# Patient Record
Sex: FEMALE | Race: WHITE | NOT HISPANIC OR LATINO | Employment: FULL TIME | URBAN - METROPOLITAN AREA
[De-identification: names, ages, dates, MRNs, and addresses within clinical notes are randomized per-mention and may not be internally consistent; named-entity substitution may affect disease eponyms.]

---

## 2022-05-12 ENCOUNTER — OFFICE VISIT (OUTPATIENT)
Dept: BARIATRICS | Facility: CLINIC | Age: 42
End: 2022-05-12
Payer: COMMERCIAL

## 2022-05-12 VITALS
HEIGHT: 69 IN | SYSTOLIC BLOOD PRESSURE: 122 MMHG | TEMPERATURE: 98.5 F | BODY MASS INDEX: 30.27 KG/M2 | HEART RATE: 78 BPM | WEIGHT: 204.4 LBS | DIASTOLIC BLOOD PRESSURE: 70 MMHG

## 2022-05-12 DIAGNOSIS — E66.9 OBESITY, CLASS I, BMI 30-34.9: Primary | ICD-10-CM

## 2022-05-12 PROBLEM — E66.811 OBESITY, CLASS I, BMI 30-34.9: Status: ACTIVE | Noted: 2022-05-12

## 2022-05-12 PROBLEM — E55.9 VITAMIN D DEFICIENCY: Status: ACTIVE | Noted: 2018-10-11

## 2022-05-12 PROCEDURE — 99203 OFFICE O/P NEW LOW 30 MIN: CPT | Performed by: PHYSICIAN ASSISTANT

## 2022-05-12 NOTE — PATIENT INSTRUCTIONS
Food log (ie ) www myfitnesspal com,sparkpeople  com,loseit com,[employer]  com,etc  baritastic-1100  No sugary beverages  At least 64oz of water daily  Recommend 3 times a week     Weight Management   WHAT YOU NEED TO KNOW:   Why is important to manage my weight? Being overweight increases your risk of health conditions such as heart disease, high blood pressure, type 2 diabetes, and certain types of cancer  It can also increase your risk for osteoarthritis, sleep apnea, and other respiratory problems  Aim for a slow, steady weight loss  Even a small amount of weight loss can lower your risk of health problems  What are the risks of being overweight? Extra weight can cause many health problems, including the following:  Diabetes (high blood sugar level)    High blood pressure or high cholesterol    Heart disease    Stroke    Gallbladder or liver disease    Cancer of the colon, breast, prostate, liver, or kidney    Sleep apnea    Arthritis or gout    What do I need to know about screening? Screening is done to check for health conditions before you have signs or symptoms  If you are 28to 79years old, your blood sugar level may be checked every 3 years for signs of prediabetes or diabetes  Your healthcare provider will check your blood pressure at each visit  High blood pressure can lead to a stroke or other problems  Your provider may check for signs of heart disease, cancer, or other health problems  How do I lose weight safely? A safe and healthy way to lose weight is to eat fewer calories and get regular exercise  You can lose up about 1 pound a week by decreasing the number of calories you eat by 500 calories each day  You can decrease calories by eating smaller portion sizes or by cutting out high-calorie foods  Read labels to find out how many calories are in the foods you eat  You can also burn calories with exercise such as walking, swimming, or biking   You will be more likely to keep weight off if you make these changes part of your lifestyle  Exercise at least 30 minutes per day on most days of the week  You can also fit in more physical activity by taking the stairs instead of the elevator or parking farther away from stores  Ask your healthcare provider about the best exercise plan for you  What is a healthy meal plan that can help me manage my weight? A healthy meal plan includes a variety of foods, contains fewer calories, and helps you stay healthy  A healthy meal plan includes the following:     Eat whole-grain foods more often  A healthy meal plan should contain fiber  Fiber is the part of grains, fruits, and vegetables that is not broken down by your body  Whole-grain foods are healthy and provide extra fiber in your diet  Some examples of whole-grain foods are whole-wheat breads and pastas, oatmeal, brown rice, and bulgur  Eat a variety of vegetables every day  Include dark, leafy greens such as spinach, kale, erik greens, and mustard greens  Eat yellow and orange vegetables such as carrots, sweet potatoes, and winter squash  Eat a variety of fruits every day  Choose fresh or canned fruit (canned in its own juice or light syrup) instead of juice  Fruit juice has very little or no fiber  Eat low-fat dairy foods  Drink fat-free (skim) milk or 1% milk  Eat fat-free yogurt and low-fat cottage cheese  Try low-fat cheeses such as mozzarella and other reduced-fat cheeses  Choose meat and other protein foods that are low in fat  Choose beans or other legumes such as split peas or lentils  Choose fish, skinless poultry (chicken or turkey), or lean cuts of red meat (beef or pork)  Before you cook meat or poultry, cut off any visible fat  Use less fat and oil  Try baking foods instead of frying them  Add less fat, such as margarine, sour cream, regular salad dressing and mayonnaise to foods  Eat fewer high-fat foods   Some examples of high-fat foods include french fries, doughnuts, ice cream, and cakes  Eat fewer sweets  Limit foods and drinks that are high in sugar  This includes candy, cookies, regular soda, and sweetened drinks  What are some ways I can decrease calories? Eat smaller portions  Use a small plate with smaller servings  Do not eat second helpings  When you eat at a restaurant, ask for a box and place half of your meal in the box before you eat  Share an entrée with someone else  Replace high-calorie snacks with healthy, low-calorie snacks  Choose fresh fruit, vegetables, fat-free rice cakes, or air-popped popcorn instead of potato chips, nuts, or chocolate  Choose water or calorie-free drinks instead of soda or sweetened drinks  Do not shop for groceries when you are hungry  You may be more likely to make unhealthy food choices  Take a grocery list of healthy foods and shop after you have eaten  Eat regular meals  Do not skip meals  Skipping meals can lead to overeating later in the day  This can make it harder for you to lose weight  Eat a healthy snack in place of a meal if you do not have time to eat a regular meal  Talk with a dietitian to help you create a meal plan and schedule that is right for you  What other things should I consider as I try to lose weight? Be aware of situations that may give you the urge to overeat, such as eating while watching television  Find ways to avoid these situations  For example, read a book, go for a walk, or do crafts  Meet with a weight loss support group or friends who are also trying to lose weight  This may help you stay motivated to continue working on your weight loss goals  CARE AGREEMENT:   You have the right to help plan your care  Learn about your health condition and how it may be treated  Discuss treatment options with your healthcare providers to decide what care you want to receive  You always have the right to refuse treatment   The above information is an  only  It is not intended as medical advice for individual conditions or treatments  Talk to your doctor, nurse or pharmacist before following any medical regimen to see if it is safe and effective for you  © Copyright [employer] 2022 Information is for End User's use only and may not be sold, redistributed or otherwise used for commercial purposes   All illustrations and images included in CareNotes® are the copyrighted property of [employer]  or [address] [place] [address]

## 2022-05-12 NOTE — ASSESSMENT & PLAN NOTE
-Discussed options of  and the role of weight loss medications   -Initial weight loss goal of 5-10% weight loss for improved health  - Labs reviewed from 11/2721 all within acceptable limits  - STOP BANG-0/8    -Patient is interested in pursuing conservative program but may do healthcore  Recommendations:  Food log (ie ) www myfitnesspal com,sparkpeople  com,loseit com,calorieking  com,etc  baritastic-1100  Recommend adding protein to breakfast or doing protein coffe  Plan for snack prior to coming home to avoid hunger and poor food choices  Try to decrease carb and increase veggies  No sugary beverages  At least 64oz of water daily  Recommend 3 times a week     Medications  -discussed possible options and side effects  Discussed that medications are an aide to help with weight loss but lifestyle medications need to be made in conjunction  Reluctant to do injectable    Not planning on another pregnancy

## 2022-05-12 NOTE — PROGRESS NOTES
Assessment/Plan:    Obesity, Class I, BMI 30-34 9  -Discussed options of  and the role of weight loss medications   -Initial weight loss goal of 5-10% weight loss for improved health  - Labs reviewed from 11/2721 all within acceptable limits  - STOP BANG-0/8    -Patient is interested in pursuing conservative program but may do healthcore  Recommendations:  Food log (ie ) www myfitnesspal com,sparkpeople  com,loseit com,calorieking  com,etc  baritastic-1100  Recommend adding protein to breakfast or doing protein coffe  Plan for snack prior to coming home to avoid hunger and poor food choices  Try to decrease carb and increase veggies  No sugary beverages  At least 64oz of water daily  Recommend 3 times a week     Medications  -discussed possible options and side effects  Discussed that medications are an aide to help with weight loss but lifestyle medications need to be made in conjunction  Reluctant to do injectable  Not planning on another pregnancy      Follow up in approximately 2 months with Non-Surgical Physician/Advanced Practitioner  Diagnoses and all orders for this visit:    Obesity, Class I, BMI 30-34 9    Other orders  -     levonorgestrel (MIRENA) 20 MCG/24HR IUD; 1 each by Intrauterine route          Subjective:   Chief Complaint   Patient presents with    Consult     MWM consult s/b 0-8 goal wt 155/160       Patient ID: Dawson Alvarez  is a 39 y o  female with excess weight/obesity here to pursue weight management  History reviewed  No pertinent past medical history  HPI:  Here for initial consult  She had been seen by [facility] dietician in the past  She also   had been doing [employer] for years  She feels she has a good knowledge of what to do but isnt doing it right now  Obesity/Excess Weight:  Severity: class I  Onset:  About 9 monhts ago   Daughter has visual impairment and was trying to adjust    Gaining more over the last few months  Modifiers: Diet and Exercise and Commercial Weight Loss Programs-ie  Weight Watchers, Una Anamaria, Nutrisystem, etc   Contributing factors: Stress/Emotional Eating  Associated symptoms: decreased exercise capacity    Goals:155-160  Hydration: water at least 60 oz, 1 diet coke, coffee creamer  Alcohol: 1-2 drinkes a week  Exercise:none  Occupation: works in [employer]    Diet Recall  B:coffee  S:fruit or crackers  L:food truck BBQ chicken (usually david jar salad or leftover)  S: cookie or candy if in office  S(really hungry ) can be whatever  D: burger and side salad yesterday  Venison sausage and fries today    Colonoscopy-Not applicable    The following portions of the patient's history were reviewed and updated as appropriate: She  has no past medical history on file  She   Patient Active Problem List    Diagnosis Date Noted    Obesity, Class I, BMI 30-34 9 2022    Vitamin D deficiency 10/11/2018    Allergic dermatitis 2016     She  has a past surgical history that includes Tonsillectomy and  section  Her family history includes Hypertension in her mother  She  reports current alcohol use  She reports that she does not use drugs  No history on file for tobacco use  Current Outpatient Medications   Medication Sig Dispense Refill    levonorgestrel (MIRENA) 20 MCG/24HR IUD 1 each by Intrauterine route       No current facility-administered medications for this visit  Current Outpatient Medications on File Prior to Visit   Medication Sig    levonorgestrel (MIRENA) 20 MCG/24HR IUD 1 each by Intrauterine route     No current facility-administered medications on file prior to visit  She has No Known Allergies       Review of Systems   Constitutional: Negative for chills and fever  Respiratory: Negative for shortness of breath  Cardiovascular: Negative for chest pain and palpitations  Gastrointestinal: Negative for abdominal pain, constipation, diarrhea and vomiting  Genitourinary: Negative for difficulty urinating  Musculoskeletal: Negative for arthralgias and back pain  Skin: Negative for rash  Neurological: Positive for light-headedness (in the past )  Negative for headaches  Psychiatric/Behavioral: Negative for dysphoric mood  The patient is not nervous/anxious  Objective:    /70 (BP Location: Left arm, Patient Position: Sitting, Cuff Size: Standard)   Pulse 78   Temp 98 5 °F (36 9 °C) (Tympanic)   Ht 5' 8 5" (1 74 m)   Wt 92 7 kg (204 lb 6 4 oz)   BMI 30 63 kg/m²     Physical Exam  Vitals and nursing note reviewed  Constitutional:       General: She is not in acute distress  Appearance: She is well-developed  She is obese  HENT:      Head: Normocephalic and atraumatic  Eyes:      Conjunctiva/sclera: Conjunctivae normal    Pulmonary:      Effort: Pulmonary effort is normal  No respiratory distress  Skin:     Findings: No rash (visible)  Neurological:      Mental Status: She is alert and oriented to person, place, and time     Psychiatric:         Mood and Affect: Mood normal          Behavior: Behavior normal

## 2022-10-20 ENCOUNTER — OFFICE VISIT (OUTPATIENT)
Dept: BARIATRICS | Facility: CLINIC | Age: 42
End: 2022-10-20
Payer: COMMERCIAL

## 2022-10-20 VITALS
DIASTOLIC BLOOD PRESSURE: 72 MMHG | HEIGHT: 69 IN | SYSTOLIC BLOOD PRESSURE: 118 MMHG | RESPIRATION RATE: 16 BRPM | WEIGHT: 204.2 LBS | BODY MASS INDEX: 30.24 KG/M2 | OXYGEN SATURATION: 99 % | HEART RATE: 76 BPM

## 2022-10-20 DIAGNOSIS — E66.9 OBESITY, CLASS I, BMI 30-34.9: Primary | ICD-10-CM

## 2022-10-20 PROCEDURE — 99214 OFFICE O/P EST MOD 30 MIN: CPT | Performed by: PHYSICIAN ASSISTANT

## 2022-10-20 NOTE — ASSESSMENT & PLAN NOTE
-Patient is pursuing Conservative Program  -Initial weight loss goal of 5-10% weight loss for improved health  -Screening labs 11/27/21    Initial:204 4  Current:204 3  Change:-0 1lb    Goals:  -recommend starting to Food log -1100 calorie goal  -discussed adding on  protein to breakfast or doing protein coffe  -Plan for snack prior to coming home to avoid hunger and poor food choices  -recommend increasing exercise-discussed walking on work breaks    Medication options were discussed today and she has been modifying her diet for last almost 6 months without success  To start saxenda  Patient denies personal and family history of MCT and MEN2 tumors  Patient denies personal history of pancreatitis  Side effects discussed but not limited to diarrhea, bloating, constipation, GI upset, heartburn, increased heart rate, headache, low blood sugar, fatigue and dizziness  Titration and medication administration discussed

## 2022-10-20 NOTE — PROGRESS NOTES
Assessment/Plan:    Obesity, Class I, BMI 30-34 9  -Patient is pursuing Conservative Program  -Initial weight loss goal of 5-10% weight loss for improved health  -Screening labs 11/27/21    Initial:204 4  Current:204 3  Change:-0 1lb    Goals:  -recommend starting to Food log -1100 calorie goal  -discussed adding on  protein to breakfast or doing protein coffe  -Plan for snack prior to coming home to avoid hunger and poor food choices  -recommend increasing exercise-discussed walking on work breaks    Medication options were discussed today and she has been modifying her diet for last almost 6 months without success  To start saxenda  Patient denies personal and family history of MCT and MEN2 tumors  Patient denies personal history of pancreatitis  Side effects discussed but not limited to diarrhea, bloating, constipation, GI upset, heartburn, increased heart rate, headache, low blood sugar, fatigue and dizziness  Titration and medication administration discussed  Follow up in approximately 2 months with Non-Surgical Physician/Advanced Practitioner  Diagnoses and all orders for this visit:    Obesity, Class I, BMI 30-34 9  -     liraglutide (SAXENDA) injection; Inject subcutaneously WEEK 1 use 0 6mg day,  WEEK 2 use 1 2mg day, WEEK 3 use 1 8mg day, WEEK 4 use 2 4mg day, WEEK 5 use 3mg day  -     Insulin Pen Needle 32G X 4 MM MISC; Use in the morning          Subjective:   Chief Complaint   Patient presents with   • Follow-up     MWM 2 mth fu         Patient ID: Elaine Dial  is a 39 y o  female with excess weight/obesity here to pursue weight managment  Patient is pursuing Conservative Program      HPI  Here for MWM follow up  Last seen in May and has been making dietary changes  She is adding more fruits/vegetables and planning more of lunch now  She is still not often eating breakfast  She has increased her water intake    Main issue is with increase hunger and cravings, mostly at night    She did do intermittent fasting for a while and then stopped due to hunger  She is interested in medication to help with this   Wt Readings from Last 10 Encounters:   10/20/22 92 6 kg (204 lb 3 2 oz)   22 92 7 kg (204 lb 6 4 oz)       Food logging:no, tried intermittently  Increased appetite/cravings:yes  Exercise:nothing specific, doing more walking at work  Hydration:80 oz water a day (has increasd), coffee with small amount of cream    Diet Recall  B:coffee  S (hungrier when getting to work):fruit or crackers  L:salad or leftover (veggie enchiladas today)  S: cookie or candy if in office  S(really hungry ) can be whatever  D: protein, carb, vegetable      Colonoscopy-Not applicable    The following portions of the patient's history were reviewed and updated as appropriate:   She  has no past medical history on file  She   Patient Active Problem List    Diagnosis Date Noted   • Obesity, Class I, BMI 30-34 9 2022   • Vitamin D deficiency 10/11/2018   • Allergic dermatitis 2016     She  has a past surgical history that includes Tonsillectomy and  section  Her family history includes Hypertension in her mother  She  reports that she has never smoked  She has never used smokeless tobacco  She reports that she does not drink alcohol and does not use drugs  Current Outpatient Medications   Medication Sig Dispense Refill   • Insulin Pen Needle 32G X 4 MM MISC Use in the morning 100 each 0   • liraglutide (SAXENDA) injection Inject subcutaneously WEEK 1 use 0 6mg day,  WEEK 2 use 1 2mg day, WEEK 3 use 1 8mg day, WEEK 4 use 2 4mg day, WEEK 5 use 3mg day 15 mL 1   • levonorgestrel (MIRENA) 20 MCG/24HR IUD 1 each by Intrauterine route       No current facility-administered medications for this visit       Current Outpatient Medications on File Prior to Visit   Medication Sig   • levonorgestrel (MIRENA) 20 MCG/24HR IUD 1 each by Intrauterine route     No current facility-administered medications on file prior to visit  She has No Known Allergies       Review of Systems   Constitutional: Negative for chills and fever  Respiratory: Negative for shortness of breath  Cardiovascular: Negative for chest pain and palpitations  Gastrointestinal: Negative for abdominal pain, constipation, diarrhea and vomiting  Genitourinary: Negative for difficulty urinating  Musculoskeletal: Negative for arthralgias and back pain  Skin: Negative for rash  Neurological: Negative for headaches  Psychiatric/Behavioral: Negative for dysphoric mood  The patient is not nervous/anxious  Objective:    /72   Pulse 76   Resp 16   Ht 5' 8 5" (1 74 m)   Wt 92 6 kg (204 lb 3 2 oz)   SpO2 99%   BMI 30 60 kg/m²      Physical Exam  Vitals and nursing note reviewed  Constitutional:       General: She is not in acute distress  Appearance: She is well-developed  She is obese  HENT:      Head: Normocephalic and atraumatic  Eyes:      Conjunctiva/sclera: Conjunctivae normal    Neck:      Thyroid: No thyromegaly  Pulmonary:      Effort: Pulmonary effort is normal  No respiratory distress  Skin:     Findings: No rash (visible)  Neurological:      Mental Status: She is alert and oriented to person, place, and time     Psychiatric:         Mood and Affect: Mood normal          Behavior: Behavior normal

## 2022-10-26 ENCOUNTER — TELEPHONE (OUTPATIENT)
Dept: BARIATRICS | Facility: CLINIC | Age: 42
End: 2022-10-26

## 2022-12-28 ENCOUNTER — OFFICE VISIT (OUTPATIENT)
Dept: BARIATRICS | Facility: CLINIC | Age: 42
End: 2022-12-28

## 2022-12-28 VITALS
BODY MASS INDEX: 27.46 KG/M2 | HEART RATE: 96 BPM | WEIGHT: 185.4 LBS | RESPIRATION RATE: 16 BRPM | HEIGHT: 69 IN | SYSTOLIC BLOOD PRESSURE: 108 MMHG | DIASTOLIC BLOOD PRESSURE: 60 MMHG

## 2022-12-28 DIAGNOSIS — Z86.39 HISTORY OF OBESITY: ICD-10-CM

## 2022-12-28 DIAGNOSIS — E66.3 OVERWEIGHT: Primary | ICD-10-CM

## 2022-12-28 RX ORDER — OFLOXACIN 3 MG/ML
SOLUTION/ DROPS OPHTHALMIC
COMMUNITY
Start: 2022-11-29

## 2022-12-28 RX ORDER — PREDNISOLONE ACETATE 10 MG/ML
SUSPENSION/ DROPS OPHTHALMIC
COMMUNITY
Start: 2022-11-29

## 2022-12-28 NOTE — PROGRESS NOTES
Assessment/Plan:    Overweight  -Patient is pursuing Conservative Program  -Initial weight loss goal of 5-10% weight loss for improved health  -initially class I obesity category but now in overweight BMI category  -Screening labs 11/27/21    Initial:204 4  Current:185 3  Change:-19 1lb    Goals:  -recommend starting to Food log -1100 calorie goal  -discussed adding on  protein to breakfast or doing protein coffe  -recommend returning back to exercise when cleared by ophthamology    To continue saxenda  (start weight 204 2 on 10/20/22)  9% weight loss  Denies any side effects currently        Follow up in approximately 2 months with Non-Surgical Physician/Advanced Practitioner  Diagnoses and all orders for this visit:    Overweight    History of obesity  -     liraglutide (SAXENDA) injection; Inject subcutaneously WEEK 1 use 0 6mg day,  WEEK 2 use 1 2mg day, WEEK 3 use 1 8mg day, WEEK 4 use 2 4mg day, WEEK 5 use 3mg day  -     Insulin Pen Needle 32G X 4 MM MISC; Use in the morning    Other orders  -     ofloxacin (OCUFLOX) 0 3 % ophthalmic solution; INSTILL 1 DROP INTO THE RIGHT EYE 4 TIMES A DAY  -     prednisoLONE acetate (PRED FORTE) 1 % ophthalmic suspension; INSTILL 1 DROP IN RIGHT EYE 4 TIMES A DAY          Subjective:   Chief Complaint   Patient presents with   • Follow-up     MWM 2mth f/u; waist 31in        Patient ID: Michelle Lemus  is a 43 y o  female with excess weight/obesity here to pursue weight managment  Patient is pursuing Conservative Program      HPI here for MWM followup  She is taking saxenda 2 4 mg  It is helping her appetite control her appetite and she has not felt she needed to go up to 3mg dose  She did have constipation initially (BM 2 times a week), but then started to have some loose stool with BM every otherday  Stool has normalized nw  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 since last seen here in November    She did have surgery and was on restrictions with movement  She has not returned back to work or exercise yet  Wt Readings from Last 10 Encounters:   22 84 1 kg (185 lb 6 4 oz)   10/20/22 92 6 kg (204 lb 3 2 oz)   22 92 7 kg (204 lb 6 4 oz)       Increased appetite/cravings:no controlled  Fruit/Vegetable servings:  Exercise:not currently  Hydration:water at least 80 oz     Diet Recall:  B: coffee  L: leftovers-soup or salad  S:sometimes fruit or yogurt  D:protein, carb sometimes and vegetable  S: not always  Colonoscopy-Not applicable    The following portions of the patient's history were reviewed and updated as appropriate: She  has no past medical history on file  She   Patient Active Problem List    Diagnosis Date Noted   • Overweight 2022   • Vitamin D deficiency 10/11/2018   • Allergic dermatitis 2016     She  has a past surgical history that includes Tonsillectomy and  section  Her family history includes Hypertension in her mother  She  reports that she has never smoked  She has never used smokeless tobacco  She reports that she does not drink alcohol and does not use drugs  Current Outpatient Medications   Medication Sig Dispense Refill   • Insulin Pen Needle 32G X 4 MM MISC Use in the morning 100 each 0   • levonorgestrel (MIRENA) 20 MCG/24HR IUD 1 each by Intrauterine route     • liraglutide (SAXENDA) injection Inject subcutaneously WEEK 1 use 0 6mg day,  WEEK 2 use 1 2mg day, WEEK 3 use 1 8mg day, WEEK 4 use 2 4mg day, WEEK 5 use 3mg day 15 mL 1   • ofloxacin (OCUFLOX) 0 3 % ophthalmic solution INSTILL 1 DROP INTO THE RIGHT EYE 4 TIMES A DAY  • prednisoLONE acetate (PRED FORTE) 1 % ophthalmic suspension INSTILL 1 DROP IN RIGHT EYE 4 TIMES A DAY       No current facility-administered medications for this visit       Current Outpatient Medications on File Prior to Visit   Medication Sig   • levonorgestrel (MIRENA) 20 MCG/24HR IUD 1 each by Intrauterine route   • ofloxacin (OCUFLOX) 0 3 % ophthalmic solution INSTILL 1 DROP INTO THE RIGHT EYE 4 TIMES A DAY  • prednisoLONE acetate (PRED FORTE) 1 % ophthalmic suspension INSTILL 1 DROP IN RIGHT EYE 4 TIMES A DAY   • [DISCONTINUED] Insulin Pen Needle 32G X 4 MM MISC Use in the morning   • [DISCONTINUED] liraglutide (SAXENDA) injection Inject subcutaneously WEEK 1 use 0 6mg day,  WEEK 2 use 1 2mg day, WEEK 3 use 1 8mg day, WEEK 4 use 2 4mg day, WEEK 5 use 3mg day     No current facility-administered medications on file prior to visit  She has No Known Allergies       Review of Systems   Constitutional: Negative for fatigue and fever  Eyes: Positive for redness  Respiratory: Negative for shortness of breath  Cardiovascular: Negative for chest pain and palpitations  Gastrointestinal: Negative for abdominal pain, constipation, diarrhea and vomiting  Denies GERD   Genitourinary: Negative for difficulty urinating  Musculoskeletal: Negative for arthralgias and back pain  Skin: Negative for rash  Neurological: Negative for headaches  Psychiatric/Behavioral: Negative for dysphoric mood  The patient is not nervous/anxious  Objective:    /60   Pulse 96   Resp 16   Ht 5' 8 5" (1 74 m)   Wt 84 1 kg (185 lb 6 4 oz)   BMI 27 78 kg/m²      Physical Exam  Vitals and nursing note reviewed  Constitutional:       General: She is not in acute distress  Appearance: She is well-developed  Comments: Overweight     HENT:      Head: Normocephalic and atraumatic  Eyes:      Comments: Right eye injected     Neck:      Thyroid: No thyromegaly  Pulmonary:      Effort: Pulmonary effort is normal  No respiratory distress  Skin:     Findings: No rash (visible)  Neurological:      Mental Status: She is alert and oriented to person, place, and time     Psychiatric:         Mood and Affect: Mood normal          Behavior: Behavior normal

## 2022-12-28 NOTE — ASSESSMENT & PLAN NOTE
-Patient is pursuing Conservative Program  -Initial weight loss goal of 5-10% weight loss for improved health  -initially class I obesity category but now in overweight BMI category  -Screening labs 11/27/21    Initial:204 4  Current:185 3  Change:-19 1lb    Goals:  -recommend starting to Food log -1100 calorie goal  -discussed adding on  protein to breakfast or doing protein coffe  -recommend returning back to exercise when cleared by ophthamology    To continue saxenda  (start weight 204 2 on 10/20/22)  9% weight loss   Denies any side effects currently

## 2023-02-28 ENCOUNTER — OFFICE VISIT (OUTPATIENT)
Dept: BARIATRICS | Facility: CLINIC | Age: 43
End: 2023-02-28

## 2023-02-28 VITALS
SYSTOLIC BLOOD PRESSURE: 118 MMHG | BODY MASS INDEX: 26.55 KG/M2 | DIASTOLIC BLOOD PRESSURE: 64 MMHG | HEART RATE: 71 BPM | WEIGHT: 177.2 LBS | OXYGEN SATURATION: 98 %

## 2023-02-28 DIAGNOSIS — E66.3 OVERWEIGHT: Primary | ICD-10-CM

## 2023-02-28 DIAGNOSIS — Z86.39 HISTORY OF OBESITY: ICD-10-CM

## 2023-02-28 DIAGNOSIS — E55.9 VITAMIN D DEFICIENCY: ICD-10-CM

## 2023-02-28 PROBLEM — H33.20 RETINAL DETACHMENT: Status: RESOLVED | Noted: 2023-02-28 | Resolved: 2023-02-28

## 2023-02-28 PROBLEM — H33.20 RETINAL DETACHMENT: Status: ACTIVE | Noted: 2023-02-28

## 2023-02-28 PROBLEM — Z86.69 HISTORY OF RETINAL DETACHMENT: Status: ACTIVE | Noted: 2023-02-28

## 2023-02-28 NOTE — ASSESSMENT & PLAN NOTE
Initial: 204  lbs  Current:177  lbs  Goal:  155 lbs    - Weight not at goal  - Patient is interested in Conservative Program  - Labs reviewed: As below   -No adverse reactions or side effects to Saxenda 3 mg daily  Medication refilled  General Recommendations:  Nutrition:  Eat breakfast daily  Do not skip meals  Food log (ie ) www myfitnesspal com, sparkpeople  com, loseit com, calorieking  com, etc     Practice mindful eating  Be sure to set aside time to eat, eat slowly, and savor your food  Hydration: At least 64oz of water daily  No sugar sweetened beverages  No juice (eat the fruit instead)  Exercise:  Studies have shown that the ideal exercise goal is somewhere between 150 to 300 minutes of moderate intensity exercise a week  Start with exercising 10 minutes every other day and gradually increase physical activity with a goal of at least 150 minutes of moderate intensity exercise a week, divided over at least 3 days a week  An example of this would be exercising 30 minutes a day, 5 days a week  Resistance training can increase muscle mass and increase our resting metabolic rate  FULL BODY resistance training is recommended 2-3 times a week  Do not do this on consecutive days to allow for muscle recovery  Aim for a bare minimum 5000 steps, even on days you do not exercise  Monitoring:   Weigh yourself daily  If this causes undue stress, then just weigh yourself once a week  Weigh yourself the same time of the day with the same amount of clothing on  Preferably this should be done after waking up, before you eat, and with no clothing or minimal clothing on  Specific Goals:  No sugary beverages  At least 64oz of water daily  Increase physical activity by 10 minutes daily  Calculated calorie goal for patient  Calorie goal: 1200-calorie goal recommended  Provided with meal plan  Reviewed meal plan  Return visit: 3 months

## 2023-02-28 NOTE — PROGRESS NOTES
Assessment/Plan:  Alejandra Echavarria was seen today for follow-up  Diagnoses and all orders for this visit:    Overweight    Vitamin D deficiency    History of obesity  -     liraglutide (SAXENDA) injection; Inject 0 5 mL (3 mg total) under the skin daily  -     Insulin Pen Needle 32G X 4 MM MISC; Use in the morning       Overweight  Initial: 204  lbs  Current:177  lbs  Goal:  155 lbs    - Weight not at goal  - Patient is interested in Conservative Program  - Labs reviewed: As below   -No adverse reactions or side effects to Saxenda 3 mg daily  Medication refilled  General Recommendations:  Nutrition:  Eat breakfast daily  Do not skip meals  Food log (ie ) www myfitnesspal com, sparkpeople  com, loseit com, calorieking  com, etc     Practice mindful eating  Be sure to set aside time to eat, eat slowly, and savor your food  Hydration: At least 64oz of water daily  No sugar sweetened beverages  No juice (eat the fruit instead)  Exercise:  Studies have shown that the ideal exercise goal is somewhere between 150 to 300 minutes of moderate intensity exercise a week  Start with exercising 10 minutes every other day and gradually increase physical activity with a goal of at least 150 minutes of moderate intensity exercise a week, divided over at least 3 days a week  An example of this would be exercising 30 minutes a day, 5 days a week  Resistance training can increase muscle mass and increase our resting metabolic rate  FULL BODY resistance training is recommended 2-3 times a week  Do not do this on consecutive days to allow for muscle recovery  Aim for a bare minimum 5000 steps, even on days you do not exercise  Monitoring:   Weigh yourself daily  If this causes undue stress, then just weigh yourself once a week  Weigh yourself the same time of the day with the same amount of clothing on    Preferably this should be done after waking up, before you eat, and with no clothing or minimal clothing on     Specific Goals:  No sugary beverages  At least 64oz of water daily  Increase physical activity by 10 minutes daily  Calculated calorie goal for patient  Calorie goal: 1200-calorie goal recommended  Provided with meal plan  Reviewed meal plan  Return visit: 3 months  Total time spent reviewing chart, interviewing patient, examining patient, discussing plan, answering all questions, and documentin min        ______________________________________________________________________        Subjective:   Chief Complaint   Patient presents with   • Follow-up     MWM 2 mth fu,     Patient here to discuss weight associated problems and nutrition goals  HPI: Janette Pressley  is a 43 y o  female with history of vitamin D deficiency and excess weight  Weight loss plan:  Conservative Program   This is my first time meeting with the patient for a follow-up appointment in the absence of her usual provider (Sim Boast)  Most recent notes and records were reviewed  Last appointment she reported taking Saxenda 2 4 mg daily and did not feel the need to increase to the 3 mg dose  She reported mild constipation initially which normalized  Wt Readings from Last 10 Encounters:   23 80 4 kg (177 lb 3 2 oz)   22 84 1 kg (185 lb 6 4 oz)   10/20/22 92 6 kg (204 lb 3 2 oz)   22 92 7 kg (204 lb 6 4 oz)     Initial weight: 204  Last OV weight: 185  Current weight: 177  Change in weight: -8lbs  -13% since starting liraglutide  Food logging:  Hunger/Cravings:  Sweets - eats fruit or oikos protein yogurt   Dining out:  Once every 2 weeks  Hydration: Water 64-80  Alcohol: No   Exercise: No  Sleep: 7-8  Weight Monitoring:  Daily  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 [employer]    Patient denies personal and family history of  pancreatitis, thyroid cancer, MEN-2 tumors  Denies any hx of glaucoma, seizures, kidney stones, gallstones  Denies Hx of CAD, PAD, palpitations, arrhythmia     Denies uncontrolled anxiety or depression, suicidal ideation or behavior, insomnia or sleep disturbance  The following portions of the patient's history were reviewed and updated as appropriate: allergies, current medications, past family history, past medical history, past social history, past surgical history, and problem list     Review Of Systems:  Review of Systems   Constitutional: Negative for activity change, appetite change, fatigue and fever  Respiratory: Negative for cough and shortness of breath  Cardiovascular: Negative for chest pain, palpitations and leg swelling  Gastrointestinal: Negative for abdominal pain, constipation, diarrhea, nausea and vomiting  Endocrine: Negative for cold intolerance and heat intolerance  Genitourinary: Negative for difficulty urinating and dysuria  Musculoskeletal: Negative for arthralgias, back pain and gait problem  Skin: Negative for pallor and rash  Neurological: Negative for headaches  Psychiatric/Behavioral: Negative for dysphoric mood, sleep disturbance and suicidal ideas (or HI)  The patient is not nervous/anxious  Objective:  /64   Pulse 71   Wt 80 4 kg (177 lb 3 2 oz)   SpO2 98%   BMI 26 55 kg/m²   Physical Exam  Vitals and nursing note reviewed  Constitutional:       General: She is not in acute distress  Appearance: Normal appearance  She is not ill-appearing or diaphoretic  Eyes:      General: No scleral icterus  Cardiovascular:      Rate and Rhythm: Normal rate and regular rhythm  Pulses: Normal pulses  Heart sounds: No murmur heard  Pulmonary:      Effort: Pulmonary effort is normal  No respiratory distress  Breath sounds: Normal breath sounds  No wheezing or rhonchi  Abdominal:      General: Bowel sounds are normal  There is no distension  Palpations: Abdomen is soft  There is no mass  Tenderness: There is no abdominal tenderness  Musculoskeletal:      Cervical back: Neck supple  Right lower leg: No edema  Left lower leg: No edema  Lymphadenopathy:      Cervical: No cervical adenopathy  Skin:     Capillary Refill: Capillary refill takes less than 2 seconds  Findings: No lesion or rash  Neurological:      Mental Status: She is alert and oriented to person, place, and time  Gait: Gait normal    Psychiatric:         Mood and Affect: Mood normal          Behavior: Behavior normal        Labs:  Labs from 11/27/2021 reviewed  Free T4 0 93  TSH 1 42  Insulin level 6 3  Lipid panel and CMP within normal limits  Lipid panel was quite favorable with an HDL of 70 and LDL of 65  Vitamin D level on 10 11/20/1836

## 2023-05-30 ENCOUNTER — OFFICE VISIT (OUTPATIENT)
Dept: BARIATRICS | Facility: CLINIC | Age: 43
End: 2023-05-30

## 2023-05-30 VITALS
HEART RATE: 82 BPM | SYSTOLIC BLOOD PRESSURE: 110 MMHG | DIASTOLIC BLOOD PRESSURE: 70 MMHG | BODY MASS INDEX: 25.83 KG/M2 | HEIGHT: 69 IN | RESPIRATION RATE: 17 BRPM | WEIGHT: 174.4 LBS

## 2023-05-30 DIAGNOSIS — E66.3 OVERWEIGHT: Primary | ICD-10-CM

## 2023-05-30 DIAGNOSIS — Z86.39 HISTORY OF OBESITY: ICD-10-CM

## 2023-05-30 NOTE — PROGRESS NOTES
Assessment/Plan:    Overweight  -Patient is pursuing Conservative Program  -Initial weight loss goal of 5-10% weight loss for improved health  -initially class I obesity category but now in overweight BMI category  -Screening labs 11/27/21    Initial:204 4  Current:174 4 (-2 8 lb from last OV)  Change:-30lb    Goals:  -continue current exercise 4 times a week  Recommend adding on resistance training  -continue with water intake  -discussed continuing to have well rounded diet    To continue saxenda  (start weight 204 2 on 10/20/22)  14 6% weight loss  Denies any side effects currently      Labs and records reviewed prior to OV today    Follow up in approximately 4 months with Non-Surgical Physician/Advanced Practitioner  Diagnoses and all orders for this visit:    Overweight    History of obesity  -     liraglutide (SAXENDA) injection; Inject 0 5 mL (3 mg total) under the skin daily  -     Insulin Pen Needle 32G X 4 MM MISC; Use in the morning          Subjective:   Chief Complaint   Patient presents with   • Follow-up     MWM 3mth f/u;Waist-30in        Patient ID: Yousif Cheema  is a 43 y o  female with excess weight/obesity here to pursue weight managment  Patient is pursuing Conservative Program      HPI Here for MWM followup  She is not having as much weight loss as prior but is noticing changes in her clothing size  She is currently doing run/walk program   She is taking the saxenda 3mg  She denies any side effects  Wt Readings from Last 10 Encounters:   05/30/23 79 1 kg (174 lb 6 4 oz)   02/28/23 80 4 kg (177 lb 3 2 oz)   12/28/22 84 1 kg (185 lb 6 4 oz)   10/20/22 92 6 kg (204 lb 3 2 oz)   05/12/22 92 7 kg (204 lb 6 4 oz)       Food logging:no  Increased appetite/cravings:  Fruit/Vegetable servings:  Exercise:first strides 4 times a week 45 minutes  Hydration:water 64 oz , coffee w/cream    Diet recal:  S:fruit or pretzel  L:sandwich or salad and fruit  D: salad 1-2 times a week   Protein (mostly chicken), veggie and starch      Colonoscopy-Not applicable    The following portions of the patient's history were reviewed and updated as appropriate:   She  has no past medical history on file  She   Patient Active Problem List    Diagnosis Date Noted   • History of retinal detachment 2023   • Overweight 2022   • Vitamin D deficiency 10/11/2018   • Allergic dermatitis 2016     She  has a past surgical history that includes Tonsillectomy and  section  Her family history includes Hypertension in her mother  She  reports that she has never smoked  She has never used smokeless tobacco  She reports that she does not drink alcohol and does not use drugs  Current Outpatient Medications   Medication Sig Dispense Refill   • Insulin Pen Needle 32G X 4 MM MISC Use in the morning 100 each 0   • levonorgestrel (MIRENA) 20 MCG/24HR IUD 1 each by Intrauterine route     • liraglutide (SAXENDA) injection Inject 0 5 mL (3 mg total) under the skin daily 15 mL 2   • ofloxacin (OCUFLOX) 0 3 % ophthalmic solution INSTILL 1 DROP INTO THE RIGHT EYE 4 TIMES A DAY  • prednisoLONE acetate (PRED FORTE) 1 % ophthalmic suspension INSTILL 1 DROP IN RIGHT EYE 4 TIMES A DAY       No current facility-administered medications for this visit  Current Outpatient Medications on File Prior to Visit   Medication Sig   • levonorgestrel (MIRENA) 20 MCG/24HR IUD 1 each by Intrauterine route   • [DISCONTINUED] Insulin Pen Needle 32G X 4 MM MISC Use in the morning   • [DISCONTINUED] liraglutide (SAXENDA) injection Inject 0 5 mL (3 mg total) under the skin daily   • ofloxacin (OCUFLOX) 0 3 % ophthalmic solution INSTILL 1 DROP INTO THE RIGHT EYE 4 TIMES A DAY  • prednisoLONE acetate (PRED FORTE) 1 % ophthalmic suspension INSTILL 1 DROP IN RIGHT EYE 4 TIMES A DAY     No current facility-administered medications on file prior to visit       Review of Systems   Constitutional: Negative for fever     Respiratory: Negative "for shortness of breath  Cardiovascular: Negative for chest pain and palpitations  Gastrointestinal: Negative for abdominal pain, constipation, diarrhea and vomiting  Genitourinary: Negative for difficulty urinating  Musculoskeletal: Negative for arthralgias and back pain  Skin: Negative for rash  Neurological: Negative for headaches  Psychiatric/Behavioral: Negative for dysphoric mood  The patient is not nervous/anxious  Objective:    /70   Pulse 82   Resp 17   Ht 5' 9\" (1 753 m)   Wt 79 1 kg (174 lb 6 4 oz)   BMI 25 75 kg/m²      Physical Exam  Vitals and nursing note reviewed  Constitutional:       General: She is not in acute distress  Appearance: She is well-developed  Comments: overnight   HENT:      Head: Normocephalic and atraumatic  Eyes:      Conjunctiva/sclera: Conjunctivae normal    Neck:      Thyroid: No thyromegaly  Pulmonary:      Effort: Pulmonary effort is normal  No respiratory distress  Skin:     Findings: No rash (visible)  Neurological:      Mental Status: She is alert and oriented to person, place, and time     Psychiatric:         Mood and Affect: Mood normal          Behavior: Behavior normal          "

## 2023-05-30 NOTE — ASSESSMENT & PLAN NOTE
-Patient is pursuing Conservative Program  -Initial weight loss goal of 5-10% weight loss for improved health  -initially class I obesity category but now in overweight BMI category  -Screening labs 11/27/21    Initial:204 4  Current:174 4 (-2 8 lb from last OV)  Change:-30lb    Goals:  -continue current exercise 4 times a week  Recommend adding on resistance training  -continue with water intake  -discussed continuing to have well rounded diet    To continue saxenda  (start weight 204 2 on 10/20/22)  14 6% weight loss  Denies any side effects currently

## 2023-06-07 DIAGNOSIS — Z86.39 HISTORY OF OBESITY: ICD-10-CM

## 2023-09-05 ENCOUNTER — TELEPHONE (OUTPATIENT)
Dept: BARIATRICS | Facility: CLINIC | Age: 43
End: 2023-09-05

## 2023-10-10 ENCOUNTER — TELEPHONE (OUTPATIENT)
Dept: BARIATRICS | Facility: CLINIC | Age: 43
End: 2023-10-10

## 2023-10-12 ENCOUNTER — OFFICE VISIT (OUTPATIENT)
Dept: BARIATRICS | Facility: CLINIC | Age: 43
End: 2023-10-12
Payer: COMMERCIAL

## 2023-10-12 VITALS
HEIGHT: 69 IN | WEIGHT: 184.8 LBS | HEART RATE: 70 BPM | SYSTOLIC BLOOD PRESSURE: 105 MMHG | BODY MASS INDEX: 27.37 KG/M2 | RESPIRATION RATE: 16 BRPM | DIASTOLIC BLOOD PRESSURE: 65 MMHG

## 2023-10-12 DIAGNOSIS — Z86.39 HISTORY OF OBESITY: ICD-10-CM

## 2023-10-12 DIAGNOSIS — E66.3 OVERWEIGHT: Primary | ICD-10-CM

## 2023-10-12 PROCEDURE — 99213 OFFICE O/P EST LOW 20 MIN: CPT | Performed by: INTERNAL MEDICINE

## 2023-10-12 NOTE — PROGRESS NOTES
Assessment/Plan:  Darshan Li was seen today for follow-up. Diagnoses and all orders for this visit:    Overweight    History of obesity  -     Insulin Pen Needle 32G X 4 MM MISC; Use in the morning  -     liraglutide (SAXENDA) injection; Inject 0.5 mL (3 mg total) under the skin daily    Patient is regaining weight now off of Saxenda. I provided her with a list of other pharmacies where [facility] can [facility]. Should she not be able to locate the medication and continue to regain weight we discussed other options including Contrave which may help her control her cravings. Discussed the common as well as rare but severe side effects of the medication. This is the preferred medication that she would be most interested in  should she not be able to remain on Saxenda. Patient demonstrate understanding and agreement with the plan. Total time spent reviewing chart, interviewing patient, examining patient, discussing plan, answering all questions, and documentin min.       ______________________________________________________________________        Subjective:   Chief Complaint   Patient presents with    Follow-up     MWM 4m f/u; waist-in     Patient here to discuss weight associated problems and nutrition goals  HPI: Orion Green  is a 43 y.o. female with history of MND deficiency and excess weight. Weight loss plan:  Conservative Program.   Most recent notes and records were reviewed. Patient typically follows with Artur Reyes. Had met with patient for the first time on 2023. At that time she was taking Saxenda 2.4 mg daily and did not feel the need to increase to 3 mg dose. Her weight was 177. He did increase Saxenda to 3 mg daily and followed up with Shira on 2023. Weight at that time was 174. Patient has regained 10 pounds since being off of the medication and fears that she is continuing to regain weight. Wishes to restart Saxenda.   Wt Readings from Last 10 Encounters:   10/12/23 83.8 kg (184 lb 12.8 oz)   05/30/23 79.1 kg (174 lb 6.4 oz)   02/28/23 80.4 kg (177 lb 3.2 oz)   12/28/22 84.1 kg (185 lb 6.4 oz)   10/20/22 92.6 kg (204 lb 3.2 oz)   05/12/22 92.7 kg (204 lb 6.4 oz)     Initial: 204  lbs  Prior office visit [address] logging:  Patient denies personal and family history of  pancreatitis, thyroid cancer, MEN-2 tumors. Denies any hx of glaucoma, seizures, kidney stones, gallstones. Denies Hx of CAD, PAD, palpitations, arrhythmia. Denies uncontrolled anxiety or depression, suicidal ideation or behavior, insomnia or sleep disturbance. The following portions of the patient's history were reviewed and updated as appropriate: allergies, current medications, past family history, past medical history, past social history, past surgical history, and problem list.    Review Of Systems:  Review of Systems   Constitutional:  Negative for activity change, appetite change, fatigue and fever. Respiratory:  Negative for cough and shortness of breath. Cardiovascular:  Negative for chest pain, palpitations and leg swelling. Gastrointestinal:  Negative for abdominal pain, constipation, diarrhea, nausea and vomiting. Endocrine: Negative for cold intolerance and heat intolerance. Genitourinary:  Negative for difficulty urinating and dysuria. Musculoskeletal:  Negative for arthralgias, back pain and gait problem. Skin:  Negative for pallor and rash. Neurological:  Negative for headaches. Psychiatric/Behavioral:  Negative for dysphoric mood, sleep disturbance and suicidal ideas (or HI). The patient is not nervous/anxious. Objective:  /65   Pulse 70   Resp 16   Ht 5' 9" (1.753 m)   Wt 83.8 kg (184 lb 12.8 oz)   BMI 27.29 kg/m²   Physical Exam  Constitutional:       Appearance: Normal appearance. Pulmonary:      Effort: Pulmonary effort is normal.   Neurological:      Mental Status: She is alert.    Psychiatric: Mood and Affect: Mood normal.         Behavior: Behavior normal.         Thought Content: Thought content normal.         Labs and Imaging  Recent labs and imaging have been personally reviewed.   No results found for: "WBC", "HGB", "HCT", "MCV", "PLT"  No results found for: "NA", "SODIUM", "K", "CL", "CO2", "ANIONGAP", "AGAP", "BUN", "CREATININE", "GLUC", "GLUF", "CALCIUM", "AST", "ALT", "ALKPHOS", "PROT", "TP", "BILITOT", "TBILI", "EGFR"  Lab Results   Component Value Date    HGBA1C 5.2 11/27/2021

## 2023-10-12 NOTE — PATIENT INSTRUCTIONS
VISIT SAXENDA. COM  INJECT SAXENDA DAILY SUBCUTANEOUSLY. -WEEK 1: 0.6mg daily  -if tolerated WEEK 2: 1.2mg daily  -if tolerated WEEK 3: 1.8mg daily  -if tolerated WEEK 4: 2.4mg daily  -if tolerated WEEK 5: 3mg daily    -IF NAUSEA/VOMITING DEVELOP STOP MEDICATION FOR A FEW DAYS AND DECREASE TO PREVIOUSLY TOLERATED DOSE. STAY HYDRATED. -IF YOU DEVELOP SEVERE ABDOMINAL PAIN WHICH MAY RADIATE TO THE BACK, SOMETIMES ASSOCIATED WITH FEVER, AND VOMITING, STOP MEDICATION AND SEEK URGENT CARE AS THIS MAY BE A SIGN OF PANCREATITIS. If you cannot get this medication, then we may consider starting you on contrave instead. Discharge instructions to read should you be started on this. You are being started on "contrave" or on the generic versions of its components at approximated dosages. If you develop abdominal upset, headache, dizziness, trouble sleeping, increased blood pressure, depression, anxiety, and fatigue, then you must contact the office right away. If you develop thoughts of harming yourself or others then stop the medication right away and go to the Emergency Room.

## 2023-10-20 DIAGNOSIS — E66.9 OBESITY, CLASS I, BMI 30-34.9: Primary | ICD-10-CM

## 2023-10-20 RX ORDER — NALTREXONE HYDROCHLORIDE 50 MG/1
25 TABLET, FILM COATED ORAL DAILY
Qty: 45 TABLET | Refills: 0 | Status: SHIPPED | OUTPATIENT
Start: 2023-10-20

## 2023-10-20 RX ORDER — BUPROPION HYDROCHLORIDE 150 MG/1
150 TABLET ORAL DAILY
Qty: 90 TABLET | Refills: 0 | Status: SHIPPED | OUTPATIENT
Start: 2023-10-20

## 2024-01-16 DIAGNOSIS — Z86.39 HISTORY OF OBESITY: ICD-10-CM

## 2024-01-16 RX ORDER — LIRAGLUTIDE 6 MG/ML
INJECTION, SOLUTION SUBCUTANEOUS
Qty: 15 ML | Refills: 2 | Status: SHIPPED | OUTPATIENT
Start: 2024-01-16

## 2024-02-19 ENCOUNTER — OFFICE VISIT (OUTPATIENT)
Dept: BARIATRICS | Facility: CLINIC | Age: 44
End: 2024-02-19
Payer: COMMERCIAL

## 2024-02-19 VITALS
HEIGHT: 69 IN | WEIGHT: 202.8 LBS | HEART RATE: 76 BPM | SYSTOLIC BLOOD PRESSURE: 110 MMHG | TEMPERATURE: 98.8 F | DIASTOLIC BLOOD PRESSURE: 72 MMHG | BODY MASS INDEX: 30.04 KG/M2

## 2024-02-19 DIAGNOSIS — E66.9 OBESITY, CLASS I, BMI 30-34.9: Primary | ICD-10-CM

## 2024-02-19 DIAGNOSIS — E55.9 VITAMIN D DEFICIENCY: ICD-10-CM

## 2024-02-19 PROBLEM — E66.811 OBESITY, CLASS I, BMI 30-34.9: Status: ACTIVE | Noted: 2024-02-19

## 2024-02-19 PROBLEM — E66.3 OVERWEIGHT: Status: RESOLVED | Noted: 2022-05-12 | Resolved: 2024-02-19

## 2024-02-19 PROCEDURE — 99214 OFFICE O/P EST MOD 30 MIN: CPT | Performed by: INTERNAL MEDICINE

## 2024-02-19 RX ORDER — TIRZEPATIDE 2.5 MG/.5ML
2.5 INJECTION, SOLUTION SUBCUTANEOUS WEEKLY
Qty: 2 ML | Refills: 0 | Status: SHIPPED | OUTPATIENT
Start: 2024-02-19 | End: 2024-03-18

## 2024-02-19 NOTE — PROGRESS NOTES
Assessment/Plan:  Kyra was seen today for follow-up.    Diagnoses and all orders for this visit:    Obesity, Class I, BMI 30-34.9  -     tirzepatide (Zepbound) 2.5 mg/0.5 mL auto-injector; Inject 0.5 mL (2.5 mg total) under the skin once a week for 28 days    Vitamin D deficiency       Initial: 204 lbs  Current: 202 lbs  Goal: 150 lbs    - Weight not at goal  - Patient is interested in Conservative Program  - Labs reviewed: As below.  -She would like to get back on a GLP-1 receptor agonist however Saxenda and Wegovy remain on shortage.  She is not sure if that pounds is covered by her insurance.  She demonstrated understanding that this is a GLP-1/GIP receptor agonist.  Potential side effects and risks are similar.  She accepts these risks and wishes to proceed with treatment.  She was shown how to inject using a demonstration pen.    General Recommendations:  Nutrition:  Eat breakfast daily.  Do not skip meals.     Food log (ie.) www.myfitnesspal.com, sparkpeople.com, loseit.com, calorieking.com, etc.    Practice mindful eating.  Be sure to set aside time to eat, eat slowly, and savor your food.    Hydration:    At least 64oz of water daily.  No sugar sweetened beverages.  No juice (eat the fruit instead).    Exercise:  Studies have shown that the ideal exercise goal is somewhere between 150 to 300 minutes of moderate intensity exercise a week.  Start with exercising 10 minutes every other day and gradually increase physical activity with a goal of at least 150 minutes of moderate intensity exercise a week, divided over at least 3 days a week.  An example of this would be exercising 30 minutes a day, 5 days a week.  Resistance training can increase muscle mass and increase our resting metabolic rate.   FULL BODY resistance training is recommended 2-3 times a week.  Do not do this on consecutive days to allow for muscle recovery.    Aim for a bare minimum 5000 steps, even on days you do not  exercise.    Monitoring:   Weigh yourself daily.  If this causes undue stress, then just weigh yourself once a week.  Weigh yourself the same time of the day with the same amount of clothing on.  Preferably this should be done after waking up, before you eat, and with no clothing or minimal clothing on.    Specific Goals:  Food log (ie.) www.Firm58.com,sparkpeople.com,XMPieit.com,Nvest.com,etc.     Continue tracking steps.      START ZEPBOUND  I have sent Zebound to your pharmacy. The prior authorization process will been done through our prior authorization team and can take up to 3 weeks to process through the insurance.      - Start Zepbound 2.5 mg subcutaneously injection weekly.  You will need a nurse visit in 4 weeks.  If you are doing well at that time the dose will be increased to Zepbound 5mg weekly.     - Side effects of Zepbound include nausea, vomiting, diarrhea, or constipation. Keep an eye on your heart rate while on Zepbound. If you resting heart rate is greater than 100 beats per minutes, please notify me. If you develop severe abdominal pain, stop Zepbound and go to the emergency room, as that could be a sign of pancreatitis.      - Please notify me if you have surgery, upper endoscopy, or colonoscopy scheduled, as we typically hold Zepbound for one week prior to the procedure.   - Zepbound can reduce the effectiveness of oral hormonal birth control (birth control pills). Recommend a barrier backup method such as condoms to prevent pregnancy.       Return visit:  3 months    Total time spent reviewing chart, interviewing patient, examining patient, discussing plan, answering all questions, and documentin min.       ______________________________________________________________________        Subjective:   Chief Complaint   Patient presents with    Follow-up      MWM  4 month F/U  waist 36 inch .     Patient here to discuss weight associated problems and nutrition goals  HPI: Kyra  "Kathleen  is a 43 y.o. female with history of Vitamin D deficiency and excess weight.  Weight loss plan:  Conservative Program.   Most recent notes and records were reviewed.    Wt Readings from Last 10 Encounters:   02/19/24 92 kg (202 lb 12.8 oz)   10/12/23 83.8 kg (184 lb 12.8 oz)   05/30/23 79.1 kg (174 lb 6.4 oz)   02/28/23 80.4 kg (177 lb 3.2 oz)   12/28/22 84.1 kg (185 lb 6.4 oz)   10/20/22 92.6 kg (204 lb 3.2 oz)   05/12/22 92.7 kg (204 lb 6.4 oz)     Initial: 204  lbs  Prior office visit: 184 lbs  Current: 202 lbs      Patient was on Saxenda and had gotten her weight down to 174 prior to being affected by the shortage.  Coming off of Saxenda she has gradually regained weight.  When she was last seen on 10/12/2023 her weight was 184.  Today it is 202.  We discussed starting Contrave to help mitigate weight regain.  She did not have coverage for Contrave so the generic ingredients were prescribed off label.  She reached out to me on 12/20/2023 after being on Wellbutrin 150 mg daily and naltrexone 25 mg daily for 2 months.  She had gained another 4 pounds.  Reports having stopped them before that due to giving her \"weird, crazy dreams.\"    She has stopped calorie tracking but working on reducing portion sizes and snacking.  He continues to step count.  She has a goal of and achieves approximately 5000 steps a day on weekdays and 10,000 steps on weekends.      Patient denies personal and family history of  pancreatitis, thyroid cancer, MEN-2 tumors.    The following portions of the patient's history were reviewed and updated as appropriate: allergies, current medications, past family history, past medical history, past social history, past surgical history, and problem list.    Review Of Systems:  Review of Systems   Constitutional:  Negative for activity change, appetite change, fatigue and fever.   Respiratory:  Negative for cough and shortness of breath.    Cardiovascular:  Negative for chest pain, palpitations " "and leg swelling.   Gastrointestinal:  Negative for abdominal pain, constipation, diarrhea, nausea and vomiting.   Endocrine: Negative for cold intolerance and heat intolerance.   Genitourinary:  Negative for difficulty urinating and dysuria.   Musculoskeletal:  Negative for arthralgias, back pain and gait problem.   Skin:  Negative for pallor and rash.   Neurological:  Negative for headaches.   Psychiatric/Behavioral:  Negative for dysphoric mood, sleep disturbance and suicidal ideas (or HI). The patient is not nervous/anxious.        Objective:  /72   Pulse 76   Temp 98.8 °F (37.1 °C) (Tympanic)   Ht 5' 8.5\" (1.74 m)   Wt 92 kg (202 lb 12.8 oz)   BMI 30.39 kg/m²   Physical Exam  Vitals and nursing note reviewed.   Constitutional:       General: She is not in acute distress.     Appearance: Normal appearance. She is not ill-appearing or diaphoretic.   Eyes:      General: No scleral icterus.  Cardiovascular:      Rate and Rhythm: Normal rate and regular rhythm.      Pulses: Normal pulses.      Heart sounds: No murmur heard.  Pulmonary:      Effort: Pulmonary effort is normal. No respiratory distress.      Breath sounds: Normal breath sounds. No wheezing or rhonchi.   Abdominal:      General: Bowel sounds are normal. There is no distension.      Palpations: Abdomen is soft. There is no mass.      Tenderness: There is no abdominal tenderness.   Musculoskeletal:      Cervical back: Neck supple.      Right lower leg: No edema.      Left lower leg: No edema.   Lymphadenopathy:      Cervical: No cervical adenopathy.   Skin:     Capillary Refill: Capillary refill takes less than 2 seconds.      Findings: No lesion or rash.   Neurological:      Mental Status: She is alert and oriented to person, place, and time.      Gait: Gait normal.   Psychiatric:         Mood and Affect: Mood normal.         Behavior: Behavior normal.         Labs and Imaging  Recent labs and imaging have been personally reviewed.  No results " "found for: \"WBC\", \"HGB\", \"HCT\", \"MCV\", \"PLT\"  Lab Results   Component Value Date    SODIUM 140 11/27/2021    K 4.2 11/27/2021     (H) 11/27/2021    CO2 26 11/27/2021    AGAP 4 11/27/2021    BUN 8 11/27/2021    CREATININE 0.76 11/27/2021    GLUC 82 11/27/2021    CALCIUM 8.9 11/27/2021    AST 23 11/27/2021    ALT 29 11/27/2021    ALKPHOS 76 11/27/2021    TP 7.0 11/27/2021    TBILI 0.5 11/27/2021     Lab Results   Component Value Date    HGBA1C 5.2 11/27/2021     Lab Results   Component Value Date    TSH 1.42 11/27/2021       "

## 2024-02-19 NOTE — PATIENT INSTRUCTIONS
Initial: 204 lbs  Current: 202 lbs  Goal: 150 lbs    General Recommendations:  Nutrition:  Eat breakfast daily.  Do not skip meals.     Food log (ie.) www.myfitnesspal.com, sparkpeople.com, loseit.com, calorieking.com, etc.    Practice mindful eating.  Be sure to set aside time to eat, eat slowly, and savor your food.    Hydration:    At least 64oz of water daily.  No sugar sweetened beverages.  No juice (eat the fruit instead).    Exercise:  Studies have shown that the ideal exercise goal is somewhere between 150 to 300 minutes of moderate intensity exercise a week.  Start with exercising 10 minutes every other day and gradually increase physical activity with a goal of at least 150 minutes of moderate intensity exercise a week, divided over at least 3 days a week.  An example of this would be exercising 30 minutes a day, 5 days a week.  Resistance training can increase muscle mass and increase our resting metabolic rate.   FULL BODY resistance training is recommended 2-3 times a week.  Do not do this on consecutive days to allow for muscle recovery.    Aim for a bare minimum 5000 steps, even on days you do not exercise.    Monitoring:   Weigh yourself daily.  If this causes undue stress, then just weigh yourself once a week.  Weigh yourself the same time of the day with the same amount of clothing on.  Preferably this should be done after waking up, before you eat, and with no clothing or minimal clothing on.    Specific Goals:  Food log (ie.) www.myfitnesspal.com,sparkpeople.com,loseit.com,calorieking.com,etc.     Continue tracking steps.      START ZEPBOUND  I have sent Zebound to your pharmacy. The prior authorization process will been done through our prior authorization team and can take up to 3 weeks to process through the insurance.      - Start Zepbound 2.5 mg subcutaneously injection weekly.  You will need a nurse visit in 4 weeks.  If you are doing well at that time the dose will be increased to Zepbound  5mg weekly.     - Side effects of Zepbound include nausea, vomiting, diarrhea, or constipation. Keep an eye on your heart rate while on Zepbound. If you resting heart rate is greater than 100 beats per minutes, please notify me. If you develop severe abdominal pain, stop Zepbound and go to the emergency room, as that could be a sign of pancreatitis.      - Please notify me if you have surgery, upper endoscopy, or colonoscopy scheduled, as we typically hold Zepbound for one week prior to the procedure.   - Zepbound can reduce the effectiveness of oral hormonal birth control (birth control pills). Recommend a barrier backup method such as condoms to prevent pregnancy.       Return visit:  3 months

## 2024-03-04 ENCOUNTER — TELEPHONE (OUTPATIENT)
Age: 44
End: 2024-03-04

## 2024-03-04 ENCOUNTER — TELEPHONE (OUTPATIENT)
Dept: BARIATRICS | Facility: CLINIC | Age: 44
End: 2024-03-04

## 2024-03-04 DIAGNOSIS — E66.9 OBESITY, CLASS I, BMI 30-34.9: Primary | ICD-10-CM

## 2024-03-04 NOTE — TELEPHONE ENCOUNTER
Pt asked about switching  from zepound to wegovy since she cannot receive zepound. I stated I can message the physician to see if he will approve the medication change. In the meantime we reschedule her nurse visit out another month

## 2024-03-04 NOTE — TELEPHONE ENCOUNTER
Problem: Adult Inpatient Plan of Care  Goal: Plan of Care Review  8/6/2021 0732 by Christina Bangura RNA  Outcome: Ongoing, Progressing  Flowsheets  Taken 8/6/2021 0732  Progress: no change  Outcome Summary: recieved patient from ED. patient on 2L nasal cannula sats above 90%. No shortness of air reported at this time.  Taken 8/6/2021 0717  Plan of Care Reviewed With: patient  8/6/2021 0731 by Christina Bangura RNA  Outcome: Ongoing, Progressing  Flowsheets (Taken 8/6/2021 0717)  Progress: no change  Plan of Care Reviewed With: patient  Outcome Summary: Recieved patient from ED. Patient on 2L nasal cannula  Goal: Patient-Specific Goal (Individualized)  8/6/2021 0732 by Christina Bangura RNA  Outcome: Ongoing, Progressing  8/6/2021 0731 by Christina Bangura RNA  Outcome: Ongoing, Progressing  Goal: Absence of Hospital-Acquired Illness or Injury  8/6/2021 0732 by Christina Bangura RNA  Outcome: Ongoing, Progressing  8/6/2021 0731 by Christina Bangura RNA  Outcome: Ongoing, Progressing  Intervention: Identify and Manage Fall Risk  Recent Flowsheet Documentation  Taken 8/6/2021 0700 by Christina Bangura RNA  Safety Promotion/Fall Prevention: safety round/check completed  Taken 8/6/2021 0519 by Christina Bangura RNA  Safety Promotion/Fall Prevention: safety round/check completed  Taken 8/6/2021 0515 by Christina Bangura RNA  Safety Promotion/Fall Prevention: safety round/check completed  Goal: Optimal Comfort and Wellbeing  8/6/2021 0732 by Christina Bangura RNA  Outcome: Ongoing, Progressing  8/6/2021 0731 by Christina Bangura RNA  Outcome: Ongoing, Progressing  Intervention: Provide Person-Centered Care  Recent Flowsheet Documentation  Taken 8/6/2021 0519 by Christina Bangura RNA  Trust Relationship/Rapport:   care explained   emotional support provided   choices provided  Goal: Readiness for Transition of Care  8/6/2021 0732 by Christina Bangura RNA  Outcome: Ongoing, Progressing  8/6/2021 0731 by Christina Bangura, RNA  Outcome: Ongoing,  Patient called to reschedule nurse visit and to ask questions regarding new prescription since the original prescriptioin is taking too long to be approved through insurance. Warm transferred patient to [facility] at the office.    Progressing  Intervention: Mutually Develop Transition Plan  Recent Flowsheet Documentation  Taken 8/6/2021 0520 by Christina Bangura RNA  Transportation Anticipated: family or friend will provide  Patient/Family Anticipated Services at Transition: none  Patient/Family Anticipates Transition to: home with family  Taken 8/6/2021 0510 by Christina Bangura RNA  Equipment Currently Used at Home:   cpap   oxygen   Goal Outcome Evaluation:  Plan of Care Reviewed With: patient        Progress: no change  Outcome Summary: recieved patient from ED. patient on 2L nasal cannula sats above 90%. No shortness of air reported at this time.

## 2024-03-05 ENCOUNTER — TELEPHONE (OUTPATIENT)
Age: 44
End: 2024-03-05

## 2024-03-05 NOTE — TELEPHONE ENCOUNTER
"----- Message from Wayne Flores DO sent at 2/29/2024 12:50 PM EST -----  Regarding: FW: Zepbound prior authorization  Contact: [phone]  Do we have a denial letter on record?  I didn't see one in the \"media\" section.    Wayne Flores    ----- Message -----  From: Mackenzie Noel  Sent: 2/29/2024   8:45 AM EST  To: Wayne Flores DO  Subject: FW: Zepbound prior authorization                   ----- Message -----  From: Kyra Wang  Sent: 2/28/2024   7:27 PM EST  To: [facility]  Subject: Zepbound prior authorization                     Hi there again-- I was at [facility] picking up an Rx for my  and asked about the status of the Zepbound. They said that the insurance rejected it because it wasn't on the formulary but that the prescribing doctor could fill out the form and that it could go through. The pharmacy technician sent that over to you electronically around 6:45 PM tonight. Thank you so much for all of your assistance. I appreciate it.      "

## 2024-03-11 ENCOUNTER — TELEPHONE (OUTPATIENT)
Age: 44
End: 2024-03-11

## 2024-03-11 NOTE — TELEPHONE ENCOUNTER
Pt called in regards to Wegovy med. Pt stated can't find med. Pt is wondering if can go back to [facility]. Pt requested a call back from practice to explore options.

## 2024-03-15 NOTE — TELEPHONE ENCOUNTER
Kyra Flores,     Patient called today, she is a little frustrated, she wants to go back on ZEPBOUND because she can't find the Wegovy., she did call on Monday 03/11    Pharmacy:  [facility] - [place], [place] - [address], [facility] [address]  Phone: [phone]  Fax: [phone]     CB: [phone]

## 2024-03-20 DIAGNOSIS — E66.9 OBESITY, CLASS I, BMI 30-34.9: Primary | ICD-10-CM

## 2024-03-20 RX ORDER — TIRZEPATIDE 2.5 MG/.5ML
2.5 INJECTION, SOLUTION SUBCUTANEOUS WEEKLY
Qty: 2 ML | Refills: 0 | Status: SHIPPED | OUTPATIENT
Start: 2024-03-20 | End: 2024-03-27

## 2024-03-22 ENCOUNTER — TELEPHONE (OUTPATIENT)
Dept: BARIATRICS | Facility: CLINIC | Age: 44
End: 2024-03-22

## 2024-03-22 ENCOUNTER — TELEPHONE (OUTPATIENT)
Age: 44
End: 2024-03-22

## 2024-03-22 NOTE — TELEPHONE ENCOUNTER
----- Message from Sonya Howard sent at 3/21/2024  9:17 AM EDT -----  Regarding: FW: New medicine  Contact: [phone]    ----- Message -----  From: Kyra Wang  Sent: 3/21/2024   9:14 AM EDT  To: [facility]  Subject: New medicine                                     Hi there--the Rx went through to [facility] but they need a prior authorization. Could it be faxed to them at [phone]? Their phone number is [phone]. Thank you!

## 2024-03-23 NOTE — TELEPHONE ENCOUNTER
PA for zepbound    Submitted via    [x]CMM-KEY [id number]  [][email or web address]-Case ID #    []Faxed to plan   []Other website    []Phone call Case ID #      Office notes sent, clinical questions answered. Awaiting determination    Turnaround time for your insurance to make a decision on your Prior Authorization can take 7-21 business days.

## 2024-03-25 NOTE — TELEPHONE ENCOUNTER
PA for Zepbound excluded    Reason:        Message sent to office clinical pool Yes    Denial letter scanned into Media Yes    Appeal started No

## 2024-03-27 DIAGNOSIS — E66.9 OBESITY, CLASS I, BMI 30-34.9: Primary | ICD-10-CM

## 2024-03-27 NOTE — TELEPHONE ENCOUNTER
Patient was informed of the providers message. Celina is stating that provider can sent a script for Wegovy To [facility] on file already. Patient stating that she had been prescribed Wegovy and Saxenda but due to the supply chain she has not be able to get it. Patient is okay with doing wegovy and will start calling around for it.

## 2024-04-10 ENCOUNTER — TELEPHONE (OUTPATIENT)
Dept: BARIATRICS | Facility: CLINIC | Age: 44
End: 2024-04-10

## 2024-04-11 ENCOUNTER — TELEPHONE (OUTPATIENT)
Dept: BARIATRICS | Facility: CLINIC | Age: 44
End: 2024-04-11

## 2024-04-11 ENCOUNTER — CLINICAL SUPPORT (OUTPATIENT)
Dept: BARIATRICS | Facility: CLINIC | Age: 44
End: 2024-04-11

## 2024-04-11 VITALS
HEART RATE: 83 BPM | RESPIRATION RATE: 17 BRPM | SYSTOLIC BLOOD PRESSURE: 100 MMHG | BODY MASS INDEX: 30.07 KG/M2 | TEMPERATURE: 98.1 F | DIASTOLIC BLOOD PRESSURE: 60 MMHG | HEIGHT: 69 IN | WEIGHT: 203 LBS

## 2024-04-11 DIAGNOSIS — R63.5 ABNORMAL WEIGHT GAIN: Primary | ICD-10-CM

## 2024-04-11 DIAGNOSIS — E66.9 OBESITY, CLASS I, BMI 30-34.9: Primary | ICD-10-CM

## 2024-04-11 PROCEDURE — RECHECK

## 2024-04-11 NOTE — TELEPHONE ENCOUNTER
Next dose of Wegovy 0.5 mg sent to [facility]. She should give us another update after taking the second pen of 0.5 mg. If she is tolerating it well, we can plan on submitting for the next dose.

## 2024-04-11 NOTE — PROGRESS NOTES
Patient last visit weight:202lb  Patient current visit weight:203lb    If you are taking phentermine or other oral weight loss medications, are you experiencing any of the following symptoms:  Headache:   Blurred Vision:   Chest Pain:   Palpitations:  Insomnia:   SPECIFY ORAL MEDICATION AND DOSAGE:     If you are taking an injectable medication,  are you experiencing any of the following symptoms:  Bloating:  NO  Nausea: NO  Vomiting: NO  Constipation: NO  Diarrhea:NO  SPECIFY INJECTABLE MEDICATION AND CURRENT DOSAGE: Wegovy0.25mg    PT ASKING FOR DOSE INCREASE, NO ISSUES WITH PREVIOUS DOSE.        Vitals:    Is BP less than 100/60? NO  Is BP greater than 140/90? NO  Is HR greater than 100? NO  **If yes to any of the above, have patient relax and repeat in 5-10 minutes**    Repeat values:    Is BP less than 100/60?  Is BP greater than 140/90?  Is HR greater than 100?  **If values remain outside of ranges above, please consult provider for next steps**

## 2024-05-20 DIAGNOSIS — E66.9 OBESITY, CLASS I, BMI 30-34.9: Primary | ICD-10-CM

## 2024-06-11 ENCOUNTER — OFFICE VISIT (OUTPATIENT)
Dept: BARIATRICS | Facility: CLINIC | Age: 44
End: 2024-06-11
Payer: COMMERCIAL

## 2024-06-11 VITALS
HEART RATE: 56 BPM | TEMPERATURE: 98.6 F | HEIGHT: 69 IN | WEIGHT: 194.4 LBS | DIASTOLIC BLOOD PRESSURE: 70 MMHG | RESPIRATION RATE: 17 BRPM | SYSTOLIC BLOOD PRESSURE: 110 MMHG | BODY MASS INDEX: 28.79 KG/M2

## 2024-06-11 DIAGNOSIS — E66.9 OBESITY, CLASS I, BMI 30-34.9: Primary | ICD-10-CM

## 2024-06-11 DIAGNOSIS — Z51.81 THERAPEUTIC DRUG MONITORING: ICD-10-CM

## 2024-06-11 DIAGNOSIS — E55.9 VITAMIN D DEFICIENCY: ICD-10-CM

## 2024-06-11 PROBLEM — E66.3 OVERWEIGHT: Status: ACTIVE | Noted: 2024-06-11

## 2024-06-11 PROCEDURE — 99214 OFFICE O/P EST MOD 30 MIN: CPT | Performed by: INTERNAL MEDICINE

## 2024-06-11 NOTE — PATIENT INSTRUCTIONS
Calorie track at least 1 day a week.    Aim for at least 150 minutes of increased activity each week divided over at least 3 days a week.    Drink at least 64 ounces of water each day.    Increase Wegovy to 1 mg weekly for 4 weeks.  Then increase further to Wegovy 1.7 mg weekly.  If mild side effects are experienced they typically resolve on their own.  If they persist, continue to occur after each dose, or are anything more than mild, then contact the office right away.      Nurse visit in 2 months.    Follow-up in 4 months.

## 2024-06-11 NOTE — Clinical Note
After reviewing her chart I see that she will in fact need a nurse visit in 2 months as well.  Please contact her to schedule.

## 2024-06-11 NOTE — PROGRESS NOTES
Assessment/Plan:  Kyra was seen today for follow-up.    Diagnoses and all orders for this visit:    Obesity, Class I, BMI 30-34.9  -     Discontinue: Semaglutide-Weight Management (WEGOVY) 1.7 MG/0.75ML; Inject 0.75 mL (1.7 mg total) under the skin once a week  -     Semaglutide-Weight Management (WEGOVY) 1 MG/0.5ML; Inject 0.5 mL (1 mg total) under the skin once a week for 28 days  -     Semaglutide-Weight Management (WEGOVY) 1.7 MG/0.75ML; Inject 0.75 mL (1.7 mg total) under the skin once a week  -     Basic metabolic panel; Future    Vitamin D deficiency    Therapeutic drug monitoring  -     Basic metabolic panel; Future       Calorie track at least 1 day a week.    Aim for at least 150 minutes of increased activity each week divided over at least 3 days a week.    Drink at least 64 ounces of water each day.    Increase Wegovy to 1 mg weekly for 4 weeks.  Then increase further to Wegovy 1.7 mg weekly.  If mild side effects are experienced they typically resolve on their own.  If they persist, continue to occur after each dose, or are anything more than mild, then contact the office right away.      Nurse visit in 2 months.    Follow-up in 4 months.      Total time spent reviewing chart, interviewing patient, examining patient, discussing plan, answering all questions, and documentin min.       ______________________________________________________________________    Subjective:   Chief Complaint   Patient presents with    Follow-up     MWM-4M F/u;Waist-32.5in     Patient here to discuss weight associated problems and nutrition goals  HPI: Kyra Wang  is a 43 y.o. female with history of vitamin D deficiency, and excess weight.  Weight loss plan:  Conservative Program.   Most recent notes and records were reviewed.    Wt Readings from Last 10 Encounters:   24 88.2 kg (194 lb 6.4 oz)   24 92.1 kg (203 lb)   24 92 kg (202 lb 12.8 oz)   10/12/23 83.8 kg (184 lb 12.8 oz)   23 79.1 kg  "(174 lb 6.4 oz)   02/28/23 80.4 kg (177 lb 3.2 oz)   12/28/22 84.1 kg (185 lb 6.4 oz)   10/20/22 92.6 kg (204 lb 3.2 oz)   05/12/22 92.7 kg (204 lb 6.4 oz)     Initial: 204  lbs  Prior office visit: 202 lbs (Wegovy start weight)  Current: 194 lbs  -8lbs     Summary taken from note on 2/19/2024:  \"Patient was on Saxenda and had gotten her weight down to 174 prior to being affected by the shortage.  Coming off of Saxenda she has gradually regained weight.  When she was last seen on 10/12/2023 her weight was 184.  Today it is 202.  We discussed starting Contrave to help mitigate weight regain.  She did not have coverage for Contrave so the generic ingredients were prescribed off label.  She reached out to me on 12/20/2023 after being on Wellbutrin 150 mg daily and naltrexone 25 mg daily for 2 months.  She had gained another 4 pounds.  Reports having stopped them before that due to giving her \"weird, crazy dreams.\"    She has stopped calorie tracking but working on reducing portion sizes and snacking.  He continues to step count.  She has a goal of and achieves approximately 5000 steps a day on weekdays and 10,000 steps on weekends.  Patient denies personal and family history of  pancreatitis, thyroid cancer, MEN-2 tumors.\"    Tolerating Wegovy 0.5mg without side effects.  Due for increase with next dose.    Hunger/Cravings:  Decrease appetite.   Dining out:  Occasionally. (Not even once a week)  Hydration:  Water 48-64+oz  Alcohol:  No  Exercise:  Walks 7k steps a day on weekdays, 12-13k on weekend days  Sleep:  8 hours  Weight Monitoring:  Yes      Patient denies personal and family history of pancreatitis, thyroid cancer, MEN-2 tumors.      Review Of Systems:  Review of Systems   Constitutional:  Negative for activity change, appetite change, fatigue and fever.   Respiratory:  Negative for cough and shortness of breath.    Cardiovascular:  Negative for chest pain, palpitations and leg swelling.   Gastrointestinal:  " "Negative for abdominal pain, constipation, diarrhea, nausea and vomiting.   Endocrine: Negative for cold intolerance and heat intolerance.   Genitourinary:  Negative for difficulty urinating and dysuria.   Musculoskeletal:  Negative for arthralgias, back pain and gait problem.   Skin:  Negative for pallor and rash.   Neurological:  Negative for headaches.   Psychiatric/Behavioral:  Negative for dysphoric mood, sleep disturbance and suicidal ideas (or HI). The patient is not nervous/anxious.        Objective:  /70   Pulse 56   Temp 98.6 °F (37 °C)   Resp 17   Ht 5' 8.5\" (1.74 m)   Wt 88.2 kg (194 lb 6.4 oz)   BMI 29.13 kg/m²   Physical Exam  Vitals and nursing note reviewed.   Constitutional:       General: She is not in acute distress.     Appearance: Normal appearance. She is not ill-appearing or diaphoretic.   Eyes:      General: No scleral icterus.  Cardiovascular:      Rate and Rhythm: Normal rate and regular rhythm.      Pulses: Normal pulses.      Heart sounds: No murmur heard.  Pulmonary:      Effort: Pulmonary effort is normal. No respiratory distress.      Breath sounds: Normal breath sounds. No wheezing or rhonchi.   Abdominal:      General: Bowel sounds are normal. There is no distension.      Palpations: Abdomen is soft. There is no mass.      Tenderness: There is no abdominal tenderness.   Musculoskeletal:      Cervical back: Neck supple.      Right lower leg: No edema.      Left lower leg: No edema.   Lymphadenopathy:      Cervical: No cervical adenopathy.   Skin:     Capillary Refill: Capillary refill takes less than 2 seconds.      Findings: No lesion or rash.   Neurological:      Mental Status: She is alert and oriented to person, place, and time.      Gait: Gait normal.   Psychiatric:         Mood and Affect: Mood normal.         Behavior: Behavior normal.         Labs and Imaging  Recent labs and imaging have been personally reviewed.  No results found for: \"WBC\", \"HGB\", \"HCT\", \"MCV\", " "\"PLT\"  Lab Results   Component Value Date    SODIUM 139 03/08/2024    K 4.0 03/08/2024     03/08/2024    CO2 25 03/08/2024    AGAP 10 03/08/2024    BUN 11 03/08/2024    CREATININE 0.70 03/08/2024    GLUC 88 03/08/2024    CALCIUM 9.2 03/08/2024    AST 13 05/24/2024    ALT 8 05/24/2024    ALKPHOS 74 05/24/2024    TP 7.2 05/24/2024    TBILI 0.5 05/24/2024    EGFR 110 03/08/2024     Lab Results   Component Value Date    HGBA1C 5.2 11/27/2021     Lab Results   Component Value Date    TSH 1.42 11/27/2021     "

## 2024-08-13 ENCOUNTER — CLINICAL SUPPORT (OUTPATIENT)
Dept: BARIATRICS | Facility: CLINIC | Age: 44
End: 2024-08-13

## 2024-08-13 VITALS
HEIGHT: 69 IN | TEMPERATURE: 97 F | WEIGHT: 184.4 LBS | DIASTOLIC BLOOD PRESSURE: 70 MMHG | BODY MASS INDEX: 27.31 KG/M2 | SYSTOLIC BLOOD PRESSURE: 110 MMHG | HEART RATE: 73 BPM

## 2024-08-13 DIAGNOSIS — R63.5 ABNORMAL WEIGHT GAIN: Primary | ICD-10-CM

## 2024-08-13 DIAGNOSIS — E66.9 OBESITY, CLASS I, BMI 30-34.9: Primary | ICD-10-CM

## 2024-08-13 PROCEDURE — RECHECK

## 2024-08-13 RX ORDER — SEMAGLUTIDE 1.7 MG/.75ML
1.7 INJECTION, SOLUTION SUBCUTANEOUS WEEKLY
COMMUNITY
End: 2024-08-13 | Stop reason: SDUPTHER

## 2024-08-13 RX ORDER — CLOBETASOL PROPIONATE 0.5 MG/G
CREAM TOPICAL
COMMUNITY
Start: 2024-07-11

## 2024-08-13 RX ORDER — VALACYCLOVIR HYDROCHLORIDE 1 G/1
1000 TABLET, FILM COATED ORAL 3 TIMES DAILY
COMMUNITY
Start: 2024-08-08 | End: 2024-08-15

## 2024-08-13 RX ORDER — SEMAGLUTIDE 1.7 MG/.75ML
1.7 INJECTION, SOLUTION SUBCUTANEOUS WEEKLY
Qty: 3 ML | Refills: 0 | Status: SHIPPED | OUTPATIENT
Start: 2024-08-13 | End: 2024-08-15 | Stop reason: SDUPTHER

## 2024-08-13 NOTE — PROGRESS NOTES
Patient last visit weight: 194lbs   Patient current visit weight: 184.4lbs     If you are taking phentermine or other oral weight loss medications, are you experiencing any of the following symptoms:  Headache:   Blurred Vision:   Chest Pain:   Palpitations:  Insomnia:   SPECIFY ORAL MEDICATION AND DOSAGE:     If you are taking an injectable medication,  are you experiencing any of the following symptoms:  Bloating: NO   Nausea: NO   Vomiting: NO   Constipation: NO   Diarrhea: NO   SPECIFY INJECTABLE MEDICATION AND CURRENT DOSAGE: Wegovy 1.7mg. Patient requesting to stay on 1.7mg for an additional month- requesting refill.       Vitals:    Is BP less than 100/60? NO   Is BP greater than 140/90? NO   Is HR greater than 100? NO   **If yes to any of the above, have patient relax and repeat in 5-10 minutes**    Repeat values:    Is BP less than 100/60?  Is BP greater than 140/90?  Is HR greater than 100?  **If values remain outside of ranges above, please consult provider for next steps**

## 2024-08-15 DIAGNOSIS — E66.9 OBESITY, CLASS I, BMI 30-34.9: ICD-10-CM

## 2024-08-15 RX ORDER — SEMAGLUTIDE 1.7 MG/.75ML
1.7 INJECTION, SOLUTION SUBCUTANEOUS WEEKLY
Qty: 3 ML | Refills: 2 | Status: SHIPPED | OUTPATIENT
Start: 2024-08-15 | End: 2024-11-07

## 2024-08-22 DIAGNOSIS — Z00.6 ENCOUNTER FOR EXAMINATION FOR NORMAL COMPARISON OR CONTROL IN CLINICAL RESEARCH PROGRAM: ICD-10-CM

## 2024-08-23 ENCOUNTER — APPOINTMENT (OUTPATIENT)
Dept: LAB | Facility: CLINIC | Age: 44
End: 2024-08-23

## 2024-08-23 DIAGNOSIS — Z00.6 ENCOUNTER FOR EXAMINATION FOR NORMAL COMPARISON OR CONTROL IN CLINICAL RESEARCH PROGRAM: ICD-10-CM

## 2024-08-23 PROCEDURE — 36415 COLL VENOUS BLD VENIPUNCTURE: CPT

## 2024-09-03 LAB
APOB+LDLR+PCSK9 GENE MUT ANL BLD/T: NOT DETECTED
BRCA1+BRCA2 DEL+DUP + FULL MUT ANL BLD/T: NOT DETECTED
MLH1+MSH2+MSH6+PMS2 GN DEL+DUP+FUL M: NOT DETECTED

## 2024-09-05 DIAGNOSIS — E66.9 OBESITY, CLASS I, BMI 30-34.9: Primary | ICD-10-CM

## 2024-11-21 DIAGNOSIS — E66.811 OBESITY, CLASS I, BMI 30-34.9: ICD-10-CM

## 2024-11-21 RX ORDER — SEMAGLUTIDE 2.4 MG/.75ML
2.4 INJECTION, SOLUTION SUBCUTANEOUS WEEKLY
Qty: 3 ML | Refills: 1 | Status: SHIPPED | OUTPATIENT
Start: 2024-11-21

## 2024-12-17 ENCOUNTER — OFFICE VISIT (OUTPATIENT)
Dept: BARIATRICS | Facility: CLINIC | Age: 44
End: 2024-12-17
Payer: COMMERCIAL

## 2024-12-17 VITALS
HEART RATE: 63 BPM | WEIGHT: 164.6 LBS | BODY MASS INDEX: 24.38 KG/M2 | TEMPERATURE: 97.7 F | RESPIRATION RATE: 17 BRPM | HEIGHT: 69 IN | SYSTOLIC BLOOD PRESSURE: 110 MMHG | DIASTOLIC BLOOD PRESSURE: 68 MMHG

## 2024-12-17 DIAGNOSIS — E66.811 OBESITY, CLASS I, BMI 30-34.9: ICD-10-CM

## 2024-12-17 DIAGNOSIS — Z86.39 HISTORY OF OBESITY: Primary | ICD-10-CM

## 2024-12-17 PROCEDURE — 99213 OFFICE O/P EST LOW 20 MIN: CPT | Performed by: PHYSICIAN ASSISTANT

## 2024-12-17 RX ORDER — SEMAGLUTIDE 2.4 MG/.75ML
2.4 INJECTION, SOLUTION SUBCUTANEOUS WEEKLY
Qty: 3 ML | Refills: 5 | Status: SHIPPED | OUTPATIENT
Start: 2024-12-17 | End: 2025-06-03

## 2024-12-17 NOTE — ASSESSMENT & PLAN NOTE
-Patient is pursuing Conservative Program  -Initial weight loss goal of 5-10% weight loss for improved health  -Screening labs march 2024 and Lipid/CMP wnl    Initial:202  Last Visit:184.4  Current:164.6  Change:-37.4lb (-29.4lb from last visit)  Goal:about 155      On wegovy 2.4 mg and tolerating well. To continue. 15% weight loss from start  Contraception: IUD    Continue with water intake  Discussed starting strength training  Recommend trying protein shake instead of creamer in the AM

## 2024-12-17 NOTE — PROGRESS NOTES
Assessment/Plan:    History of obesity  -Patient is pursuing Conservative Program  -Initial weight loss goal of 5-10% weight loss for improved health  -Screening labs march 2024 and Lipid/CMP wnl    Initial:202  Last Visit:184.4  Current:164.6  Change:-37.4lb (-29.4lb from last visit)  Goal:about 155      On wegovy 2.4 mg and tolerating well. To continue. 15% weight loss from start  Contraception: IUD    Continue with water intake  Discussed starting strength training  Recommend trying protein shake instead of creamer in the AM        Return in about 6 months (around 6/17/2025).medical provider followup       Diagnoses and all orders for this visit:    History of obesity    Obesity, Class I, BMI 30-34.9  -     Semaglutide-Weight Management (Wegovy) 2.4 MG/0.75ML; Inject 0.75 mL (2.4 mg total) under the skin once a week          Subjective:   Chief Complaint   Patient presents with    Follow-up     Mwm 6m f/u: waist: 29.5in        Patient ID: Kyra Wang  is a 44 y.o. female with excess weight/obesity here to pursue weight managment.  Patient is pursuing Conservative Program.     HPI    She is on 2.4mg of wegovy.  Sometimes will have 1 loose BM after the injection but otherwise tolerating well. She was on saxenda prior and did regain weight after stopping it.    Wt Readings from Last 10 Encounters:   12/17/24 74.7 kg (164 lb 9.6 oz)   08/13/24 83.6 kg (184 lb 6.4 oz)   06/11/24 88.2 kg (194 lb 6.4 oz)   04/11/24 92.1 kg (203 lb)   02/19/24 92 kg (202 lb 12.8 oz)   10/12/23 83.8 kg (184 lb 12.8 oz)   05/30/23 79.1 kg (174 lb 6.4 oz)   02/28/23 80.4 kg (177 lb 3.2 oz)   12/28/22 84.1 kg (185 lb 6.4 oz)   10/20/22 92.6 kg (204 lb 3.2 oz)       Food logging:  Increased appetite/cravings:  Exercise:walking-trying to get 7000 steps.  10,000 steps on weekends.    Hydration:at least 80 oz water, coffee w/creamer in the AM    Diet Recall:  B:coffee   L:leftovers, salad, wrap , sandwich and greek yogurt, banana  D:protein,  vegetable( salmon, rice, vegetable ex.)  S (sometimes if hungry):       The following portions of the patient's history were reviewed and updated as appropriate: She  has no past medical history on file.  She   Patient Active Problem List    Diagnosis Date Noted    History of obesity 2024    History of retinal detachment 2023    Vitamin D deficiency 10/11/2018    Allergic dermatitis 2016     She  has a past surgical history that includes Tonsillectomy and  section.  Her family history includes Hypertension in her mother.  She  reports that she has never smoked. She has never used smokeless tobacco. She reports that she does not drink alcohol and does not use drugs.  Current Outpatient Medications   Medication Sig Dispense Refill    levonorgestrel (MIRENA) 20 MCG/24HR IUD 1 each by Intrauterine route      Semaglutide-Weight Management (Wegovy) 2.4 MG/0.75ML Inject 0.75 mL (2.4 mg total) under the skin once a week 3 mL 5    clobetasol (TEMOVATE) 0.05 % cream PLEASE SEE ATTACHED FOR DETAILED DIRECTIONS       No current facility-administered medications for this visit.     Current Outpatient Medications on File Prior to Visit   Medication Sig    levonorgestrel (MIRENA) 20 MCG/24HR IUD 1 each by Intrauterine route    [DISCONTINUED] Semaglutide-Weight Management (Wegovy) 2.4 MG/0.75ML INJECT 0.75 ML (2.4 MG TOTAL) UNDER THE SKIN ONCE A WEEK    clobetasol (TEMOVATE) 0.05 % cream PLEASE SEE ATTACHED FOR DETAILED DIRECTIONS    [DISCONTINUED] valACYclovir (VALTREX) 1,000 mg tablet Take 1,000 mg by mouth Three times a day     No current facility-administered medications on file prior to visit.     She has no known allergies..    Review of Systems   Constitutional:  Negative for fever.   Respiratory:  Negative for shortness of breath.    Cardiovascular:  Negative for chest pain and palpitations.   Gastrointestinal:  Negative for abdominal pain, constipation, diarrhea and vomiting.   Genitourinary:   "Negative for difficulty urinating.   Skin:  Negative for rash.   Neurological:  Negative for headaches.   Psychiatric/Behavioral:  Negative for dysphoric mood. The patient is not nervous/anxious.        Objective:    /68   Pulse 63   Temp 97.7 °F (36.5 °C)   Resp 17   Ht 5' 8.5\" (1.74 m)   Wt 74.7 kg (164 lb 9.6 oz)   BMI 24.66 kg/m²      Physical Exam  Vitals and nursing note reviewed.   Constitutional:       General: She is not in acute distress.     Appearance: Normal appearance. She is well-developed.   HENT:      Head: Normocephalic and atraumatic.   Eyes:      Conjunctiva/sclera: Conjunctivae normal.   Neck:      Thyroid: No thyromegaly.   Pulmonary:      Effort: Pulmonary effort is normal. No respiratory distress.   Skin:     Findings: No rash (visible).   Neurological:      Mental Status: She is alert and oriented to person, place, and time.   Psychiatric:         Mood and Affect: Mood normal.         Behavior: Behavior normal.         "

## 2025-03-10 DIAGNOSIS — Z86.39 HISTORY OF OBESITY: Primary | ICD-10-CM

## 2025-06-18 ENCOUNTER — OFFICE VISIT (OUTPATIENT)
Dept: BARIATRICS | Facility: CLINIC | Age: 45
End: 2025-06-18
Payer: COMMERCIAL

## 2025-06-18 VITALS
DIASTOLIC BLOOD PRESSURE: 72 MMHG | WEIGHT: 151.4 LBS | HEIGHT: 69 IN | RESPIRATION RATE: 17 BRPM | TEMPERATURE: 98.1 F | SYSTOLIC BLOOD PRESSURE: 114 MMHG | BODY MASS INDEX: 22.42 KG/M2 | HEART RATE: 75 BPM

## 2025-06-18 DIAGNOSIS — Z79.899 MEDICATION MANAGEMENT: ICD-10-CM

## 2025-06-18 DIAGNOSIS — Z86.39 HISTORY OF OBESITY: Primary | ICD-10-CM

## 2025-06-18 PROCEDURE — 99213 OFFICE O/P EST LOW 20 MIN: CPT | Performed by: PHYSICIAN ASSISTANT

## 2025-06-18 NOTE — ASSESSMENT & PLAN NOTE
-Patient is pursuing Conservative Program  -Initial weight loss goal of 5-10% weight loss for improved health  -Screening labs march 2024 and Lipid/CMP wnl    Initial:202  Last Visit:164.6  Current:151.4  Change:-50.6lb (-13.2lb from last visit)  Goal:about 155-maintain      On wegovy 1.7 mg and tolerating well. Discussed increasing the spacing between injections to no more than 2 weeks.  To continue. More than 15% weight loss from start  Contraception: IUD    Continue with water intake  Continue walking for exercise  Recommend trying protein shake instead of creamer or collagen peptides to make sure getting enough protein.  Would make sure to have a protein 3 times a day

## 2025-06-18 NOTE — PROGRESS NOTES
Assessment/Plan:    History of obesity  -Patient is pursuing Conservative Program  -Initial weight loss goal of 5-10% weight loss for improved health  -Screening labs march 2024 and Lipid/CMP wnl    Initial:202  Last Visit:164.6  Current:151.4  Change:-50.6lb (-13.2lb from last visit)  Goal:about 155-maintain      On wegovy 1.7 mg and tolerating well. Discussed increasing the spacing between injections to no more than 2 weeks.  To continue. More than 15% weight loss from start  Contraception: IUD    Continue with water intake  Continue walking for exercise  Recommend trying protein shake instead of creamer or collagen peptides to make sure getting enough protein.  Would make sure to have a protein 3 times a day            Diagnoses and all orders for this visit:    History of obesity  -     CBC and differential; Future  -     Comprehensive metabolic panel; Future  -     Semaglutide-Weight Management (WEGOVY) 1.7 MG/0.75ML; Inject 0.75 mL (1.7 mg total) under the skin once a week Do not start before August 4, 2025.    Medication management  -     CBC and differential; Future  -     Comprehensive metabolic panel; Future          Subjective:   Chief Complaint   Patient presents with    Follow-up     MWM F/u; Waist 28in        Patient ID: Kyra Wang  is a 44 y.o. female with excess weight/obesity here to pursue weight managment.  Patient is pursuing Conservative Program.     HPI  She is on 1.7mg wegovy right now. Her weight is stable.  She has had constipation on the med but also before it and is stable  Wt Readings from Last 10 Encounters:   06/18/25 68.7 kg (151 lb 6.4 oz)   12/17/24 74.7 kg (164 lb 9.6 oz)   08/13/24 83.6 kg (184 lb 6.4 oz)   06/11/24 88.2 kg (194 lb 6.4 oz)   04/11/24 92.1 kg (203 lb)   02/19/24 92 kg (202 lb 12.8 oz)   10/12/23 83.8 kg (184 lb 12.8 oz)   05/30/23 79.1 kg (174 lb 6.4 oz)   02/28/23 80.4 kg (177 lb 3.2 oz)   12/28/22 84.1 kg (185 lb 6.4 oz)       Food logging:  Increased  appetite/cravings:  Exercise:walking at least 1 time a day   Hydration:at least 80 oz water, coffee w/creamer in the AM     Diet Recall:  B:coffee   L:leftovers,or salad  S: sometimes protein yogurt or fruit/cheese  D:protein, vegetable( salmon, rice, vegetable ex.)  S       The following portions of the patient's history were reviewed and updated as appropriate: She  has no past medical history on file.  She   Patient Active Problem List    Diagnosis Date Noted    History of obesity 2024    History of retinal detachment 2023    Vitamin D deficiency 10/11/2018    Allergic dermatitis 2016     She  has a past surgical history that includes Tonsillectomy and  section.  Her family history includes Hypertension in her mother.  She  reports that she has never smoked. She has never used smokeless tobacco. She reports that she does not drink alcohol and does not use drugs.  Current Outpatient Medications   Medication Sig Dispense Refill    levonorgestrel (MIRENA) 20 MCG/24HR IUD 1 each by Intrauterine route      [START ON 2025] Semaglutide-Weight Management (WEGOVY) 1.7 MG/0.75ML Inject 0.75 mL (1.7 mg total) under the skin once a week Do not start before 2025. 3 mL 7    clobetasol (TEMOVATE) 0.05 % cream PLEASE SEE ATTACHED FOR DETAILED DIRECTIONS       No current facility-administered medications for this visit.     Current Outpatient Medications on File Prior to Visit   Medication Sig    levonorgestrel (MIRENA) 20 MCG/24HR IUD 1 each by Intrauterine route    [DISCONTINUED] Semaglutide-Weight Management (WEGOVY) 1.7 MG/0.75ML Inject 0.75 mL (1.7 mg total) under the skin once a week    clobetasol (TEMOVATE) 0.05 % cream PLEASE SEE ATTACHED FOR DETAILED DIRECTIONS     No current facility-administered medications on file prior to visit.     She has no known allergies..    Review of Systems   Constitutional:  Negative for fever.   Respiratory:  Negative for shortness of breath.   "  Cardiovascular:  Negative for chest pain and palpitations.   Gastrointestinal:  Negative for abdominal pain, constipation, diarrhea and vomiting.   Genitourinary:  Negative for difficulty urinating.   Skin:  Negative for rash.   Neurological:  Negative for headaches.   Psychiatric/Behavioral:  Negative for dysphoric mood.        Objective:    /72 (BP Location: Left arm, Patient Position: Sitting)   Pulse 75   Temp 98.1 °F (36.7 °C) (Tympanic)   Resp 17   Ht 5' 8.5\" (1.74 m)   Wt 68.7 kg (151 lb 6.4 oz)   BMI 22.69 kg/m²      Physical Exam  Vitals and nursing note reviewed.   Constitutional:       General: She is not in acute distress.     Appearance: Normal appearance. She is well-developed.   HENT:      Head: Normocephalic and atraumatic.     Eyes:      Conjunctiva/sclera: Conjunctivae normal.     Neck:      Thyroid: No thyromegaly.   Pulmonary:      Effort: Pulmonary effort is normal. No respiratory distress.     Skin:     Findings: No rash (visible).     Neurological:      Mental Status: She is alert and oriented to person, place, and time.     Psychiatric:         Behavior: Behavior normal.         "

## 2025-06-20 ENCOUNTER — TELEPHONE (OUTPATIENT)
Dept: BARIATRICS | Facility: CLINIC | Age: 45
End: 2025-06-20

## 2025-06-20 NOTE — TELEPHONE ENCOUNTER
PA for Wegovy 1.7mg SUBMITTED to [employer]    via    [x]CMM-KEY: [id number]  []Surescripts-Case ID #   []Availity-Auth ID # NDC #  []Faxed to plan   []Other website   []Phone call Case ID #     []PA sent as URGENT    All office notes, labs and other pertaining documents and studies sent. Clinical questions answered. Awaiting determination from insurance company.     Turnaround time for your insurance to make a decision on your Prior Authorization can take 7-21 business days.

## 2025-06-23 ENCOUNTER — APPOINTMENT (OUTPATIENT)
Dept: LAB | Facility: MEDICAL CENTER | Age: 45
End: 2025-06-23
Payer: COMMERCIAL

## 2025-06-23 DIAGNOSIS — Z79.899 MEDICATION MANAGEMENT: ICD-10-CM

## 2025-06-23 DIAGNOSIS — Z86.39 HISTORY OF OBESITY: ICD-10-CM

## 2025-06-23 LAB
ALBUMIN SERPL BCG-MCNC: 4.5 G/DL (ref 3.5–5)
ALP SERPL-CCNC: 64 U/L (ref 34–104)
ALT SERPL W P-5'-P-CCNC: 9 U/L (ref 7–52)
ANION GAP SERPL CALCULATED.3IONS-SCNC: 6 MMOL/L (ref 4–13)
AST SERPL W P-5'-P-CCNC: 14 U/L (ref 13–39)
BASOPHILS # BLD AUTO: 0.06 THOUSANDS/ÂΜL (ref 0–0.1)
BASOPHILS NFR BLD AUTO: 1 % (ref 0–1)
BILIRUB SERPL-MCNC: 0.44 MG/DL (ref 0.2–1)
BUN SERPL-MCNC: 10 MG/DL (ref 5–25)
CALCIUM SERPL-MCNC: 9 MG/DL (ref 8.4–10.2)
CHLORIDE SERPL-SCNC: 101 MMOL/L (ref 96–108)
CO2 SERPL-SCNC: 30 MMOL/L (ref 21–32)
CREAT SERPL-MCNC: 0.69 MG/DL (ref 0.6–1.3)
EOSINOPHIL # BLD AUTO: 0.16 THOUSAND/ÂΜL (ref 0–0.61)
EOSINOPHIL NFR BLD AUTO: 2 % (ref 0–6)
ERYTHROCYTE [DISTWIDTH] IN BLOOD BY AUTOMATED COUNT: 12.5 % (ref 11.6–15.1)
GFR SERPL CREATININE-BSD FRML MDRD: 106 ML/MIN/1.73SQ M
GLUCOSE SERPL-MCNC: 85 MG/DL (ref 65–140)
HCT VFR BLD AUTO: 38.3 % (ref 34.8–46.1)
HGB BLD-MCNC: 12.3 G/DL (ref 11.5–15.4)
IMM GRANULOCYTES # BLD AUTO: 0.02 THOUSAND/UL (ref 0–0.2)
IMM GRANULOCYTES NFR BLD AUTO: 0 % (ref 0–2)
LYMPHOCYTES # BLD AUTO: 2.91 THOUSANDS/ÂΜL (ref 0.6–4.47)
LYMPHOCYTES NFR BLD AUTO: 44 % (ref 14–44)
MCH RBC QN AUTO: 31.1 PG (ref 26.8–34.3)
MCHC RBC AUTO-ENTMCNC: 32.1 G/DL (ref 31.4–37.4)
MCV RBC AUTO: 97 FL (ref 82–98)
MONOCYTES # BLD AUTO: 0.39 THOUSAND/ÂΜL (ref 0.17–1.22)
MONOCYTES NFR BLD AUTO: 6 % (ref 4–12)
NEUTROPHILS # BLD AUTO: 3.04 THOUSANDS/ÂΜL (ref 1.85–7.62)
NEUTS SEG NFR BLD AUTO: 47 % (ref 43–75)
NRBC BLD AUTO-RTO: 0 /100 WBCS
PLATELET # BLD AUTO: 286 THOUSANDS/UL (ref 149–390)
PMV BLD AUTO: 10.3 FL (ref 8.9–12.7)
POTASSIUM SERPL-SCNC: 4 MMOL/L (ref 3.5–5.3)
PROT SERPL-MCNC: 7.5 G/DL (ref 6.4–8.4)
RBC # BLD AUTO: 3.96 MILLION/UL (ref 3.81–5.12)
SODIUM SERPL-SCNC: 137 MMOL/L (ref 135–147)
WBC # BLD AUTO: 6.58 THOUSAND/UL (ref 4.31–10.16)

## 2025-06-23 PROCEDURE — 80053 COMPREHEN METABOLIC PANEL: CPT

## 2025-06-23 PROCEDURE — 36415 COLL VENOUS BLD VENIPUNCTURE: CPT

## 2025-06-23 PROCEDURE — 85025 COMPLETE CBC W/AUTO DIFF WBC: CPT

## 2025-06-24 ENCOUNTER — RESULTS FOLLOW-UP (OUTPATIENT)
Dept: BARIATRICS | Facility: CLINIC | Age: 45
End: 2025-06-24

## 2025-06-24 DIAGNOSIS — Z86.39 HISTORY OF OBESITY: ICD-10-CM
